# Patient Record
Sex: FEMALE | Race: WHITE | NOT HISPANIC OR LATINO | Employment: FULL TIME | URBAN - METROPOLITAN AREA
[De-identification: names, ages, dates, MRNs, and addresses within clinical notes are randomized per-mention and may not be internally consistent; named-entity substitution may affect disease eponyms.]

---

## 2017-09-19 ENCOUNTER — OFFICE VISIT (OUTPATIENT)
Dept: INTERNAL MEDICINE | Facility: CLINIC | Age: 52
End: 2017-09-19

## 2017-09-19 VITALS — WEIGHT: 143.3 LBS | BODY MASS INDEX: 22.44 KG/M2 | DIASTOLIC BLOOD PRESSURE: 66 MMHG | SYSTOLIC BLOOD PRESSURE: 110 MMHG

## 2017-09-19 DIAGNOSIS — M54.50 CHRONIC LEFT-SIDED LOW BACK PAIN WITHOUT SCIATICA: ICD-10-CM

## 2017-09-19 DIAGNOSIS — M25.552 LEFT HIP PAIN: Primary | ICD-10-CM

## 2017-09-19 DIAGNOSIS — G89.29 CHRONIC LEFT-SIDED LOW BACK PAIN WITHOUT SCIATICA: ICD-10-CM

## 2017-09-19 DIAGNOSIS — N95.1 PERIMENOPAUSE: ICD-10-CM

## 2017-09-19 DIAGNOSIS — Z00.00 ROUTINE HEALTH MAINTENANCE: ICD-10-CM

## 2017-09-19 DIAGNOSIS — M75.112 INCOMPLETE TEAR OF LEFT ROTATOR CUFF: ICD-10-CM

## 2017-09-19 DIAGNOSIS — E55.9 VITAMIN D DEFICIENCY: ICD-10-CM

## 2017-09-19 PROBLEM — S02.2XXA NOSE FRACTURE: Status: RESOLVED | Noted: 2017-09-19 | Resolved: 2017-09-19

## 2017-09-19 PROBLEM — E04.1 CYST OF THYROID: Status: ACTIVE | Noted: 2017-09-19

## 2017-09-19 PROBLEM — M75.82 TENDINITIS OF LEFT ROTATOR CUFF: Status: ACTIVE | Noted: 2017-09-19

## 2017-09-19 PROBLEM — S02.2XXA NOSE FRACTURE: Status: ACTIVE | Noted: 2017-09-19

## 2017-09-19 PROBLEM — M75.82 TENDINITIS OF LEFT ROTATOR CUFF: Status: RESOLVED | Noted: 2017-09-19 | Resolved: 2017-09-19

## 2017-09-19 PROCEDURE — 90686 IIV4 VACC NO PRSV 0.5 ML IM: CPT | Performed by: INTERNAL MEDICINE

## 2017-09-19 PROCEDURE — 99214 OFFICE O/P EST MOD 30 MIN: CPT | Performed by: INTERNAL MEDICINE

## 2017-09-19 PROCEDURE — 90471 IMMUNIZATION ADMIN: CPT | Performed by: INTERNAL MEDICINE

## 2017-09-19 NOTE — PROGRESS NOTES
"Chief Complaint   Patient presents with   • Back Pain   • Fatigue       History of Present Illness  52 y.o.  woman presents, after nearly 2 yr absence, for further evaluation of right low back pain and left groin pain.  Also with complaints of fatigue.  Thinks she sleeps ok through the night.  LMP 3/2017 and has been starting to get mild flushing and hot flashes, tolerable.    Reports right low back pain that started about 2 mos ago, described as an \"ache\" on the inside, not reproducible with palpation.  No specific injuries and no specific triggers.  It comes and goes, does not radiate, is worse with standing (not with walking) and occurs daily.  Denies falls.  Works sitting at computer more than ever and thinks posture/ergonomics is playing role in her sxs as well. Also notes sxs flare-up with increased stress.    Reports left groin pain that started about 1 month ago.  Thinks she may have pulled something with exercise.  Has caused her to limp at times, stephanie when pulling the leg/hip up.  Denies assoc'd numbness/tingling and no radiation down the leg/foot.  Reports 2-3 advil can provide some relief.  Notes it has gotten better since initial onset.    Review of Systems  ROS (+) for right low back pain, left groin pain, fatigue.  Denies insomnia.  Denies headaches, visual changes, CP, palpitations, SOB, cough, abd pain, n/v/d, urinary sxs, numbness/tingling, falls, rashes.     Still with left shoulder pain, better than initial onset but still persistent.  All other ROS reviewed and negative.    [facility]  The following portions of the patient's history were reviewed and updated as appropriate: allergies, current medications, past family history, past medical history, past social history, past surgical history and problem list.    MEDS: none    VITALS:  /66  Wt 143 lb 4.8 oz (65 kg)  BMI 22.44 kg/m2    Physical Exam   Constitutional: She is oriented to person, place, and time. She appears well-developed " "and well-nourished.   Eyes: Conjunctivae and EOM are normal.   Cardiovascular: Normal rate, regular rhythm and normal heart sounds.    Pulmonary/Chest: Effort normal and breath sounds normal. No respiratory distress. She has no wheezes. She has no rales.   Abdominal: Soft. Bowel sounds are normal.   Musculoskeletal: She exhibits tenderness (area of tenderness at the right paraspinal lumbar muscles with assoc'd mod spasm; left upper inguinal area/left suprapubic area mildly tender to palpation, minimally reproduced with ext rotation of left hip; no masses palpated; no inguinal LAD).   bilat hips FROM, no tend with palpation of the greater trochanters or the SI joints bilaterally; normal gait   Neurological: She is alert and oriented to person, place, and time. She displays normal reflexes. No cranial nerve deficit or sensory deficit. She exhibits normal muscle tone. Coordination normal.   Reflex Scores:       Bicep reflexes are 2+ on the right side and 2+ on the left side.       Patellar reflexes are 2+ on the right side and 2+ on the left side.  Psychiatric: She has a normal mood and affect. Her behavior is normal.   Nursing note and vitals reviewed.    ASSESSMENT/PLAN  Problem List Items Addressed This Visit     Perimenopause     Symptomatic with mild hot flashes; discussed menopause dx'd after 1 year without menstrual cycle (LMP 3/17)           Other Visit Diagnoses     Left hip pain    -  Primary    \"groin\"/suprapubic area; attributed to tendon/ligament strain; x 1month; referral to PT for tx as well as guidance re: HEP; prn NSAIDs    Chronic left-sided low back pain without sciatica        due to musculosk strain/spasm; rec warm compresses, prn advil; declines musc relaxant; referral to [facility] PT; also discussed improving ergonomics    Incomplete tear of left rotator cuff        chronic; will also have PT ([facility]) address this and provide HEP          FOLLOW-UP  1. Health maintenance - flu " vaccine given today  2. RTC for updated PE: fasting labs the week prior to appt (CBC, CMP, TSH, lipids, UA/micro, vit D) - escribed      Electronically signed by:    Fabiana Matthews MD  09/19/2017

## 2017-09-20 PROBLEM — N95.1 PERIMENOPAUSE: Status: ACTIVE | Noted: 2017-09-20

## 2017-09-21 NOTE — ASSESSMENT & PLAN NOTE
Symptomatic with mild hot flashes; discussed menopause dx'd after 1 year without menstrual cycle (LMP 3/17)

## 2018-01-05 ENCOUNTER — TELEPHONE (OUTPATIENT)
Dept: INTERNAL MEDICINE | Facility: CLINIC | Age: 53
End: 2018-01-05

## 2018-01-05 ENCOUNTER — OFFICE VISIT (OUTPATIENT)
Dept: INTERNAL MEDICINE | Facility: CLINIC | Age: 53
End: 2018-01-05

## 2018-01-05 ENCOUNTER — LAB (OUTPATIENT)
Dept: INTERNAL MEDICINE | Facility: CLINIC | Age: 53
End: 2018-01-05

## 2018-01-05 ENCOUNTER — HOSPITAL ENCOUNTER (OUTPATIENT)
Dept: GENERAL RADIOLOGY | Facility: HOSPITAL | Age: 53
Discharge: HOME | End: 2018-01-05
Attending: INTERNAL MEDICINE | Admitting: INTERNAL MEDICINE

## 2018-01-05 VITALS
RESPIRATION RATE: 16 BRPM | WEIGHT: 146 LBS | DIASTOLIC BLOOD PRESSURE: 64 MMHG | SYSTOLIC BLOOD PRESSURE: 118 MMHG | HEART RATE: 85 BPM | HEIGHT: 67 IN | OXYGEN SATURATION: 99 % | BODY MASS INDEX: 22.91 KG/M2

## 2018-01-05 DIAGNOSIS — J40 BRONCHITIS: ICD-10-CM

## 2018-01-05 DIAGNOSIS — E55.9 VITAMIN D DEFICIENCY: ICD-10-CM

## 2018-01-05 DIAGNOSIS — J40 BRONCHITIS: Primary | ICD-10-CM

## 2018-01-05 DIAGNOSIS — Z00.00 ROUTINE HEALTH MAINTENANCE: ICD-10-CM

## 2018-01-05 LAB
25(OH)D3 SERPL-MCNC: 23.4 NG/ML
ALBUMIN SERPL-MCNC: 4.3 G/DL (ref 3.2–4.8)
ALBUMIN/GLOB SERPL: 1.7 G/DL (ref 1.5–2.5)
ALP SERPL-CCNC: 60 U/L (ref 25–100)
ALT SERPL W P-5'-P-CCNC: 39 U/L (ref 7–40)
ANION GAP SERPL CALCULATED.3IONS-SCNC: 11 MMOL/L (ref 3–11)
ARTICHOKE IGE QN: 126 MG/DL (ref 0–130)
AST SERPL-CCNC: 25 U/L (ref 0–33)
BACTERIA UR QL AUTO: ABNORMAL /HPF
BASOPHILS # BLD AUTO: 0.04 10*3/MM3 (ref 0–0.2)
BASOPHILS NFR BLD AUTO: 0.6 % (ref 0–1)
BILIRUB SERPL-MCNC: 1.5 MG/DL (ref 0.3–1.2)
BILIRUB UR QL STRIP: NEGATIVE
BUN BLD-MCNC: 12 MG/DL (ref 9–23)
BUN/CREAT SERPL: 17.1 (ref 7–25)
CALCIUM SPEC-SCNC: 8.8 MG/DL (ref 8.7–10.4)
CHLORIDE SERPL-SCNC: 103 MMOL/L (ref 99–109)
CHOLEST SERPL-MCNC: 186 MG/DL (ref 0–200)
CLARITY UR: ABNORMAL
CO2 SERPL-SCNC: 28 MMOL/L (ref 20–31)
COLOR UR: YELLOW
CREAT BLD-MCNC: 0.7 MG/DL (ref 0.6–1.3)
DEPRECATED RDW RBC AUTO: 45.9 FL (ref 37–54)
EOSINOPHIL # BLD AUTO: 0.38 10*3/MM3 (ref 0–0.3)
EOSINOPHIL NFR BLD AUTO: 5.6 % (ref 0–3)
ERYTHROCYTE [DISTWIDTH] IN BLOOD BY AUTOMATED COUNT: 13 % (ref 11.3–14.5)
GFR SERPL CREATININE-BSD FRML MDRD: 88 ML/MIN/1.73
GLOBULIN UR ELPH-MCNC: 2.5 GM/DL
GLUCOSE BLD-MCNC: 93 MG/DL (ref 70–100)
GLUCOSE UR STRIP-MCNC: NEGATIVE MG/DL
HCT VFR BLD AUTO: 41.7 % (ref 34.5–44)
HDLC SERPL-MCNC: 45 MG/DL (ref 40–60)
HGB BLD-MCNC: 13.9 G/DL (ref 11.5–15.5)
HGB UR QL STRIP.AUTO: ABNORMAL
HYALINE CASTS UR QL AUTO: ABNORMAL /LPF
IMM GRANULOCYTES # BLD: 0.02 10*3/MM3 (ref 0–0.03)
IMM GRANULOCYTES NFR BLD: 0.3 % (ref 0–0.6)
KETONES UR QL STRIP: NEGATIVE
LEUKOCYTE ESTERASE UR QL STRIP.AUTO: ABNORMAL
LYMPHOCYTES # BLD AUTO: 1.56 10*3/MM3 (ref 0.6–4.8)
LYMPHOCYTES NFR BLD AUTO: 22.9 % (ref 24–44)
MCH RBC QN AUTO: 32.2 PG (ref 27–31)
MCHC RBC AUTO-ENTMCNC: 33.3 G/DL (ref 32–36)
MCV RBC AUTO: 96.5 FL (ref 80–99)
MONOCYTES # BLD AUTO: 1 10*3/MM3 (ref 0–1)
MONOCYTES NFR BLD AUTO: 14.7 % (ref 0–12)
NEUTROPHILS # BLD AUTO: 3.8 10*3/MM3 (ref 1.5–8.3)
NEUTROPHILS NFR BLD AUTO: 55.9 % (ref 41–71)
NITRITE UR QL STRIP: NEGATIVE
NRBC BLD MANUAL-RTO: 0 /100 WBC (ref 0–0)
PH UR STRIP.AUTO: 6.5 [PH] (ref 5–8)
PLATELET # BLD AUTO: 199 10*3/MM3 (ref 150–450)
PMV BLD AUTO: 10.8 FL (ref 6–12)
POTASSIUM BLD-SCNC: 4.1 MMOL/L (ref 3.5–5.5)
PROT SERPL-MCNC: 6.8 G/DL (ref 5.7–8.2)
PROT UR QL STRIP: NEGATIVE
RBC # BLD AUTO: 4.32 10*6/MM3 (ref 3.89–5.14)
RBC # UR: ABNORMAL /HPF
REF LAB TEST METHOD: ABNORMAL
SODIUM BLD-SCNC: 142 MMOL/L (ref 132–146)
SP GR UR STRIP: 1.02 (ref 1–1.03)
SQUAMOUS #/AREA URNS HPF: ABNORMAL /HPF
TRIGL SERPL-MCNC: 110 MG/DL (ref 0–150)
TSH SERPL DL<=0.05 MIU/L-ACNC: 1.38 MIU/ML (ref 0.35–5.35)
UROBILINOGEN UR QL STRIP: ABNORMAL
WBC NRBC COR # BLD: 6.8 10*3/MM3 (ref 3.5–10.8)
WBC UR QL AUTO: ABNORMAL /HPF
YEAST URNS QL MICRO: ABNORMAL /HPF

## 2018-01-05 PROCEDURE — 85025 COMPLETE CBC W/AUTO DIFF WBC: CPT | Performed by: INTERNAL MEDICINE

## 2018-01-05 PROCEDURE — 84443 ASSAY THYROID STIM HORMONE: CPT | Performed by: INTERNAL MEDICINE

## 2018-01-05 PROCEDURE — 80061 LIPID PANEL: CPT | Performed by: INTERNAL MEDICINE

## 2018-01-05 PROCEDURE — 86803 HEPATITIS C AB TEST: CPT | Performed by: INTERNAL MEDICINE

## 2018-01-05 PROCEDURE — 99213 OFFICE O/P EST LOW 20 MIN: CPT | Performed by: INTERNAL MEDICINE

## 2018-01-05 PROCEDURE — 71046 X-RAY EXAM CHEST 2 VIEWS: CPT

## 2018-01-05 PROCEDURE — 80053 COMPREHEN METABOLIC PANEL: CPT | Performed by: INTERNAL MEDICINE

## 2018-01-05 PROCEDURE — 81001 URINALYSIS AUTO W/SCOPE: CPT | Performed by: INTERNAL MEDICINE

## 2018-01-05 PROCEDURE — 82306 VITAMIN D 25 HYDROXY: CPT | Performed by: INTERNAL MEDICINE

## 2018-01-05 NOTE — TELEPHONE ENCOUNTER
Spoke with pt and advised of provider comment. She verbalized understanding, thanked our office for the time and ended the call.

## 2018-01-05 NOTE — TELEPHONE ENCOUNTER
Pt called wanting to know if she needed to go to the pharmacy and  an antibiotic. I read the impression of the xray report to her and told he nothing acute was going on as far as having pneumonia or anything like that. I told her more than likely she wouldn't need to go  anything but I would double check with Dr. Matthews. She verbalized understanding, thanked me and we ended the call. Please advise if anything additional is needed?

## 2018-01-05 NOTE — TELEPHONE ENCOUNTER
Thanks for informing patient about normal CXR.    Should proceed with over-the-counter meds as discussed but no indication to proceed with abx at this time

## 2018-01-05 NOTE — PROGRESS NOTES
"Chief Complaint   Patient presents with   • Cough   • URI   • Wheezing       History of Present Illness  52 y.o.  woman presents for further eval of cold sxs  HPI started 3 d ago with chest congestion and cough, productive of white sputum. Has been having chest tightness and wheezing; no h/o asthma.  Since then, has developed sore throat, nasal congestion, runny nose, productive of white nasal drainage.  Denies fevers.  Has taken some mucinex.     Review of Systems  ROS (+) for productive cough with wheezing, nasal congestion, runny nose.  Denies sore throat, ear sxs, CP, palpitations. All other ROS reviewed and negative.    [facility]  The following portions of the patient's history were reviewed and updated as appropriate: allergies, current medications, past family history, past medical history, past social history, past surgical history and problem list.    MEDS: none    VITALS:  /64  Pulse 85  Resp 16  Ht 170.2 cm (67\")  Wt 66.2 kg (146 lb)  SpO2 99%  BMI 22.87 kg/m2    Physical Exam   Constitutional: She is oriented to person, place, and time. She appears well-developed and well-nourished.   HENT:   Right Ear: External ear normal.   Left Ear: External ear normal.   Mouth/Throat: Oropharynx is clear and moist. No oropharyngeal exudate (no erythema, swelling, exudate).   Nasal mucosa severely swollen, no erythema, bilaterally   Eyes: Conjunctivae and EOM are normal.   Cardiovascular: Normal rate, regular rhythm and normal heart sounds.    Pulmonary/Chest: Effort normal. No respiratory distress. She has wheezes (only with coughing). She has rales (LLL > RLL). She exhibits no tenderness.   Speaks in complete sentences   Abdominal: Soft. Bowel sounds are normal.   Lymphadenopathy:     She has no cervical adenopathy.   Neurological: She is alert and oriented to person, place, and time.   Skin: Skin is warm and dry. No rash noted.   Psychiatric: She has a normal mood and affect. Her behavior is " normal.   Nursing note and vitals reviewed.      ASSESSMENT/PLAN  Problem List Items Addressed This Visit     None      Visit Diagnoses     Bronchitis    -  Primary    CXR for abnl lung exam; rec antihist, nasal saline spray, steroid nasal spray, prn NSAIDs, mucinex DM; sample dulera 2p BID prn (gargle/spit)    Relevant Orders    XR Chest PA & Lateral          FOLLOW-UP  1. Health maintenance - flu vacc 9/17  2. RTC 1/9/18 for PE as scheduled; PE labs done this morning    Electronically signed by:    Fabiana Matthews MD  01/05/2018

## 2018-01-09 ENCOUNTER — OFFICE VISIT (OUTPATIENT)
Dept: INTERNAL MEDICINE | Facility: CLINIC | Age: 53
End: 2018-01-09

## 2018-01-09 VITALS
HEART RATE: 70 BPM | SYSTOLIC BLOOD PRESSURE: 118 MMHG | WEIGHT: 147 LBS | DIASTOLIC BLOOD PRESSURE: 68 MMHG | RESPIRATION RATE: 16 BRPM | HEIGHT: 67 IN | BODY MASS INDEX: 23.07 KG/M2 | OXYGEN SATURATION: 99 %

## 2018-01-09 DIAGNOSIS — R21 RASH: ICD-10-CM

## 2018-01-09 DIAGNOSIS — E04.1 CYST OF THYROID: ICD-10-CM

## 2018-01-09 DIAGNOSIS — J06.9 VIRAL UPPER RESPIRATORY TRACT INFECTION: ICD-10-CM

## 2018-01-09 DIAGNOSIS — Z12.11 ENCOUNTER FOR SCREENING COLONOSCOPY: ICD-10-CM

## 2018-01-09 DIAGNOSIS — N95.1 PERIMENOPAUSE: ICD-10-CM

## 2018-01-09 DIAGNOSIS — Z00.00 PE (PHYSICAL EXAM), ROUTINE: Primary | ICD-10-CM

## 2018-01-09 DIAGNOSIS — E55.9 VITAMIN D DEFICIENCY: ICD-10-CM

## 2018-01-09 LAB — HCV AB SER DONR QL: NORMAL

## 2018-01-09 PROCEDURE — 99396 PREV VISIT EST AGE 40-64: CPT | Performed by: INTERNAL MEDICINE

## 2018-01-09 NOTE — PROGRESS NOTES
"Chief Complaint   Patient presents with   • Annual Exam   • Rash       History of Present Illness  52 y.o.  woman presents for updated phys examination.  Feeling better from recent cold; did not take any abx.  Still has some residual cough and nasal congestion but overall improved.  Reports rash on chest, upper back, a little on arms, abdomen, and upper thighs, that started Saturday, 3 days ago.  Notes rash is itchy, sparing the hands, feet, lower legs, lower back, and face.  No known exposures; no new meds, topical agents (soaps, lotions, shampoos, body washes, detergents).  Denies previous similar episodes.    Review of Systems  Denies headaches, visual changes (wears contacts and uses bifocals, noting no success with progressive lenses), CP, palpitations, SOB, sore throat, abd pain, n/v/d, difficulty with urination, numbness/tingling, falls, mood changes, lightheadedness, hearing changes.    LMP 3/17; no abnl spotting/discharge.    ROS (+) for pruritic rash x 3 days as above.     All other ROS reviewed and negative.    [facility]  The following portions of the patient's history were reviewed and updated as appropriate: allergies, current medications, past family history, past medical history, past social history, past surgical history and problem list.    MEDS: none    VITALS:  /68  Pulse 70  Resp 16  Ht 170.8 cm (67.25\")  Wt 66.7 kg (147 lb)  SpO2 99%  BMI 22.85 kg/m2    Physical Exam   Constitutional: She is oriented to person, place, and time. She appears well-developed and well-nourished.   HENT:   Head: Normocephalic.   Right Ear: External ear normal.   Left Ear: External ear normal.   Nose: Nose normal.   Mouth/Throat: Oropharynx is clear and moist and mucous membranes are normal. No oropharyngeal exudate.   Mod nasal mucosal swelling, improved   Eyes: Conjunctivae and EOM are normal. Pupils are equal, round, and reactive to light.   Wears contacts   Neck: Normal range of motion. Neck " supple. Carotid bruit is not present (bilaterally). No thyromegaly (no palpable masses, nontender) present.   Cardiovascular: Normal rate, regular rhythm and normal heart sounds.    Pulmonary/Chest: Effort normal and breath sounds normal. No respiratory distress. She has no wheezes. She has no rales.   Abdominal: Soft. Bowel sounds are normal. She exhibits no distension and no mass. There is no hepatosplenomegaly. There is no tenderness.   Genitourinary:   Genitourinary Comments: Breast/pelvic exams deferred to GYN     Musculoskeletal: Normal range of motion. She exhibits no edema.   Lymphadenopathy:     She has no cervical adenopathy.   Neurological: She is alert and oriented to person, place, and time. She has normal reflexes. She displays normal reflexes. No cranial nerve deficit.   Skin: Skin is warm and dry. Rash (collection of erythematous papules with excoriations on the chest between the breasts and upper back, scattered fewer similar lesions at the upper arms/legs, sides of abdomen, pruritic) noted.   Psychiatric: She has a normal mood and affect. Her behavior is normal.   Nursing note and vitals reviewed.      LABS  Results for orders placed or performed in visit on 01/05/18   Comprehensive Metabolic Panel   Result Value Ref Range    Glucose 93 70 - 100 mg/dL    BUN 12 9 - 23 mg/dL    Creatinine 0.70 0.60 - 1.30 mg/dL    Sodium 142 132 - 146 mmol/L    Potassium 4.1 3.5 - 5.5 mmol/L    Chloride 103 99 - 109 mmol/L    CO2 28.0 20.0 - 31.0 mmol/L    Calcium 8.8 8.7 - 10.4 mg/dL    Total Protein 6.8 5.7 - 8.2 g/dL    Albumin 4.30 3.20 - 4.80 g/dL    ALT (SGPT) 39 7 - 40 U/L    AST (SGOT) 25 0 - 33 U/L    Alkaline Phosphatase 60 25 - 100 U/L    Total Bilirubin 1.5 (H) 0.3 - 1.2 mg/dL    eGFR Non African Amer 88 >60 mL/min/1.73    Globulin 2.5 gm/dL    A/G Ratio 1.7 1.5 - 2.5 g/dL    BUN/Creatinine Ratio 17.1 7.0 - 25.0    Anion Gap 11.0 3.0 - 11.0 mmol/L   Lipid Panel   Result Value Ref Range    Total  Cholesterol 186 0 - 200 mg/dL    Triglycerides 110 0 - 150 mg/dL    HDL Cholesterol 45 40 - 60 mg/dL    LDL Cholesterol  126 0 - 130 mg/dL   TSH   Result Value Ref Range    TSH 1.381 0.350 - 5.350 mIU/mL   Vitamin D 25 Hydroxy   Result Value Ref Range    25 Hydroxy, Vitamin D 23.4 ng/ml   CBC Auto Differential   Result Value Ref Range    WBC 6.80 3.50 - 10.80 10*3/mm3    RBC 4.32 3.89 - 5.14 10*6/mm3    Hemoglobin 13.9 11.5 - 15.5 g/dL    Hematocrit 41.7 34.5 - 44.0 %    MCV 96.5 80.0 - 99.0 fL    MCH 32.2 (H) 27.0 - 31.0 pg    MCHC 33.3 32.0 - 36.0 g/dL    RDW 13.0 11.3 - 14.5 %    RDW-SD 45.9 37.0 - 54.0 fl    MPV 10.8 6.0 - 12.0 fL    Platelets 199 150 - 450 10*3/mm3    Neutrophil % 55.9 41.0 - 71.0 %    Lymphocyte % 22.9 (L) 24.0 - 44.0 %    Monocyte % 14.7 (H) 0.0 - 12.0 %    Eosinophil % 5.6 (H) 0.0 - 3.0 %    Basophil % 0.6 0.0 - 1.0 %    Immature Grans % 0.3 0.0 - 0.6 %    Neutrophils, Absolute 3.80 1.50 - 8.30 10*3/mm3    Lymphocytes, Absolute 1.56 0.60 - 4.80 10*3/mm3    Monocytes, Absolute 1.00 0.00 - 1.00 10*3/mm3    Eosinophils, Absolute 0.38 (H) 0.00 - 0.30 10*3/mm3    Basophils, Absolute 0.04 0.00 - 0.20 10*3/mm3    Immature Grans, Absolute 0.02 0.00 - 0.03 10*3/mm3    nRBC 0.0 0.0 - 0.0 /100 WBC   Urinalysis - Urine, Clean Catch   Result Value Ref Range    Color, UA Yellow Yellow, Straw    Appearance, UA Slightly Cloudy (A) Clear    pH, UA 6.5 5.0 - 8.0    Specific Gravity, UA 1.020 1.005 - 1.030    Glucose, UA Negative Negative    Ketones, UA Negative Negative    Bilirubin, UA Negative Negative    Blood, UA Moderate (2+) (A) Negative    Protein, UA Negative Negative    Leuk Esterase, UA Large (3+) (A) Negative    Nitrite, UA Negative Negative    Urobilinogen, UA 0.2 E.U./dL 0.2 - 1.0 E.U./dL   Urinalysis, Microscopic Only - Urine, Clean Catch   Result Value Ref Range    RBC, UA 0-2 None Seen, 0-2 /HPF    WBC, UA 6-12 (A) None Seen /HPF    Bacteria, UA None Seen None Seen, Trace /HPF    Squamous  Epithelial Cells, UA 7-12 (A) None Seen, 0-2 /HPF    Yeast, UA Large/3+ Budding Yeast None Seen /HPF    Hyaline Casts, UA None Seen 0 - 6 /LPF    Methodology Manual Light Microscopy    Hepatitis C antibody   Result Value Ref Range    Hepatitis C Ab Non-Reactive Non-Reactive     1/5/18 nl CXR    ASSESSMENT/PLAN  Problem List Items Addressed This Visit     Cyst of thyroid     Stable TFTs; rec f/u thyr u/s on R. Cyst (last u/s 12/15) but patient declines for now; asx         Vitamin D deficiency     Low at 23.4; rec OTC vit D 2000 units QD supplementation         Perimenopause     LMP 3/17, noting official menopause if no further menstrual cycles as of 3/18; plan to order baseline DEXA thereafter         PE (physical exam), routine - Primary     Health maintenance - flu vacc 9/17; Tdap 3/11; rec looking into Zostavax coverage; mammo 1/17 at  per pt; GYN exam with nl Pap 12/16 at ; DEXA with menopause (probably next yr); colonosc re-referral made; eye exam at [facility] pending - wears contacts and uses readers; dental exam once per yr - rec considering every 6 mos cleanings; (+)seat belt use           Other Visit Diagnoses     Encounter for screening colonoscopy        Relevant Orders    Ambulatory Referral to Gastroenterology    Rash        x3d; rec antihistamine, avoiding hot showers/heat; use nonfragrant/hypoallergenic soaps, shampoos, lotions; HC cream thin layer to itchy spots prn; f/u prn    Viral upper respiratory tract infection        reviewed nl CXR; rec resume antihistamine and steroid nasal spray          FOLLOW-UP  RTC 1yr for annual PE    Electronically signed by:    Fabiana Matthews MD  01/09/2018

## 2018-01-09 NOTE — ASSESSMENT & PLAN NOTE
Health maintenance - flu vacc 9/17; Tdap 3/11; rec looking into Zostavax coverage; mammo 1/17 at  per pt; GYN exam with nl Pap 12/16 at ; DEXA with menopause (probably next yr); colonosc re-referral made; [facility] wears contacts and uses readers; dental exam once per yr - rec considering every 6 mos cleanings; (+)seat belt use

## 2018-01-11 NOTE — ASSESSMENT & PLAN NOTE
LMP 3/17, noting official menopause if no further menstrual cycles as of 3/18; plan to order baseline DEXA thereafter

## 2018-02-05 ENCOUNTER — OUTSIDE FACILITY SERVICE (OUTPATIENT)
Dept: GASTROENTEROLOGY | Facility: CLINIC | Age: 53
End: 2018-02-05

## 2018-02-05 PROCEDURE — G0105 COLORECTAL SCRN; HI RISK IND: HCPCS | Performed by: INTERNAL MEDICINE

## 2018-02-14 PROBLEM — K57.30 DIVERTICULOSIS OF COLON: Status: ACTIVE | Noted: 2018-02-14

## 2020-05-13 ENCOUNTER — TELEPHONE (OUTPATIENT)
Dept: INTERNAL MEDICINE | Facility: CLINIC | Age: 55
End: 2020-05-13

## 2020-05-13 DIAGNOSIS — Z00.00 PE (PHYSICAL EXAM), ROUTINE: ICD-10-CM

## 2020-05-13 DIAGNOSIS — N95.1 PERIMENOPAUSE: ICD-10-CM

## 2020-05-13 DIAGNOSIS — E55.9 VITAMIN D DEFICIENCY: Primary | ICD-10-CM

## 2020-05-13 NOTE — TELEPHONE ENCOUNTER
Patient has a physical on 5/18 and wanted to do labs before but she doesn't have any active orders/    You can reach the patient at [phone]

## 2020-05-14 ENCOUNTER — LAB (OUTPATIENT)
Dept: LAB | Facility: HOSPITAL | Age: 55
End: 2020-05-14

## 2020-05-14 DIAGNOSIS — E55.9 VITAMIN D DEFICIENCY: ICD-10-CM

## 2020-05-14 DIAGNOSIS — N95.1 PERIMENOPAUSE: ICD-10-CM

## 2020-05-14 DIAGNOSIS — Z00.00 PE (PHYSICAL EXAM), ROUTINE: ICD-10-CM

## 2020-05-14 PROBLEM — E78.2 MIXED HYPERLIPIDEMIA: Status: ACTIVE | Noted: 2020-05-14

## 2020-05-14 LAB
25(OH)D3 SERPL-MCNC: 21.6 NG/ML (ref 30–100)
ALBUMIN SERPL-MCNC: 4.4 G/DL (ref 3.5–5.2)
ALBUMIN/GLOB SERPL: 1.6 G/DL
ALP SERPL-CCNC: 53 U/L (ref 39–117)
ALT SERPL W P-5'-P-CCNC: 18 U/L (ref 1–33)
ANION GAP SERPL CALCULATED.3IONS-SCNC: 12.1 MMOL/L (ref 5–15)
AST SERPL-CCNC: 16 U/L (ref 1–32)
BACTERIA UR QL AUTO: NORMAL /HPF
BASOPHILS # BLD AUTO: 0.08 10*3/MM3 (ref 0–0.2)
BASOPHILS NFR BLD AUTO: 1.3 % (ref 0–1.5)
BILIRUB SERPL-MCNC: 1.1 MG/DL (ref 0.2–1.2)
BILIRUB UR QL STRIP: NEGATIVE
BUN BLD-MCNC: 12 MG/DL (ref 6–20)
BUN/CREAT SERPL: 14.8 (ref 7–25)
CALCIUM SPEC-SCNC: 9.9 MG/DL (ref 8.6–10.5)
CHLORIDE SERPL-SCNC: 103 MMOL/L (ref 98–107)
CHOLEST SERPL-MCNC: 204 MG/DL (ref 0–200)
CLARITY UR: CLEAR
CO2 SERPL-SCNC: 26.9 MMOL/L (ref 22–29)
COLOR UR: YELLOW
CREAT BLD-MCNC: 0.81 MG/DL (ref 0.57–1)
DEPRECATED RDW RBC AUTO: 43.8 FL (ref 37–54)
EOSINOPHIL # BLD AUTO: 0.18 10*3/MM3 (ref 0–0.4)
EOSINOPHIL NFR BLD AUTO: 2.9 % (ref 0.3–6.2)
ERYTHROCYTE [DISTWIDTH] IN BLOOD BY AUTOMATED COUNT: 12.5 % (ref 12.3–15.4)
GFR SERPL CREATININE-BSD FRML MDRD: 73 ML/MIN/1.73
GLOBULIN UR ELPH-MCNC: 2.8 GM/DL
GLUCOSE BLD-MCNC: 91 MG/DL (ref 65–99)
GLUCOSE UR STRIP-MCNC: NEGATIVE MG/DL
HCT VFR BLD AUTO: 41.3 % (ref 34–46.6)
HDLC SERPL-MCNC: 42 MG/DL (ref 40–60)
HGB BLD-MCNC: 14.4 G/DL (ref 12–15.9)
HGB UR QL STRIP.AUTO: NEGATIVE
HYALINE CASTS UR QL AUTO: NORMAL /LPF
IMM GRANULOCYTES # BLD AUTO: 0.02 10*3/MM3 (ref 0–0.05)
IMM GRANULOCYTES NFR BLD AUTO: 0.3 % (ref 0–0.5)
KETONES UR QL STRIP: NEGATIVE
LDLC SERPL CALC-MCNC: 132 MG/DL (ref 0–100)
LDLC/HDLC SERPL: 3.13 {RATIO}
LEUKOCYTE ESTERASE UR QL STRIP.AUTO: NEGATIVE
LYMPHOCYTES # BLD AUTO: 2.72 10*3/MM3 (ref 0.7–3.1)
LYMPHOCYTES NFR BLD AUTO: 43.8 % (ref 19.6–45.3)
MCH RBC QN AUTO: 33.2 PG (ref 26.6–33)
MCHC RBC AUTO-ENTMCNC: 34.9 G/DL (ref 31.5–35.7)
MCV RBC AUTO: 95.2 FL (ref 79–97)
MONOCYTES # BLD AUTO: 0.63 10*3/MM3 (ref 0.1–0.9)
MONOCYTES NFR BLD AUTO: 10.1 % (ref 5–12)
NEUTROPHILS # BLD AUTO: 2.58 10*3/MM3 (ref 1.7–7)
NEUTROPHILS NFR BLD AUTO: 41.6 % (ref 42.7–76)
NITRITE UR QL STRIP: NEGATIVE
NRBC BLD AUTO-RTO: 0 /100 WBC (ref 0–0.2)
PH UR STRIP.AUTO: 5.5 [PH] (ref 5–8)
PLATELET # BLD AUTO: 239 10*3/MM3 (ref 140–450)
PMV BLD AUTO: 10.9 FL (ref 6–12)
POTASSIUM BLD-SCNC: 4.6 MMOL/L (ref 3.5–5.2)
PROT SERPL-MCNC: 7.2 G/DL (ref 6–8.5)
PROT UR QL STRIP: NEGATIVE
RBC # BLD AUTO: 4.34 10*6/MM3 (ref 3.77–5.28)
RBC # UR: NORMAL /HPF
REF LAB TEST METHOD: NORMAL
SODIUM BLD-SCNC: 142 MMOL/L (ref 136–145)
SP GR UR STRIP: 1.02 (ref 1–1.03)
SQUAMOUS #/AREA URNS HPF: NORMAL /HPF
TRIGL SERPL-MCNC: 152 MG/DL (ref 0–150)
TSH SERPL DL<=0.05 MIU/L-ACNC: 1.72 UIU/ML (ref 0.27–4.2)
UROBILINOGEN UR QL STRIP: NORMAL
VLDLC SERPL-MCNC: 30.4 MG/DL (ref 5–40)
WBC NRBC COR # BLD: 6.21 10*3/MM3 (ref 3.4–10.8)
WBC UR QL AUTO: NORMAL /HPF

## 2020-05-14 PROCEDURE — 84443 ASSAY THYROID STIM HORMONE: CPT | Performed by: INTERNAL MEDICINE

## 2020-05-14 PROCEDURE — 81001 URINALYSIS AUTO W/SCOPE: CPT | Performed by: INTERNAL MEDICINE

## 2020-05-14 PROCEDURE — 82306 VITAMIN D 25 HYDROXY: CPT | Performed by: INTERNAL MEDICINE

## 2020-05-14 PROCEDURE — 80053 COMPREHEN METABOLIC PANEL: CPT | Performed by: INTERNAL MEDICINE

## 2020-05-14 PROCEDURE — 85025 COMPLETE CBC W/AUTO DIFF WBC: CPT | Performed by: INTERNAL MEDICINE

## 2020-05-14 PROCEDURE — 80061 LIPID PANEL: CPT | Performed by: INTERNAL MEDICINE

## 2020-05-18 ENCOUNTER — OFFICE VISIT (OUTPATIENT)
Dept: INTERNAL MEDICINE | Facility: CLINIC | Age: 55
End: 2020-05-18

## 2020-05-18 VITALS
DIASTOLIC BLOOD PRESSURE: 60 MMHG | SYSTOLIC BLOOD PRESSURE: 108 MMHG | OXYGEN SATURATION: 99 % | WEIGHT: 151.2 LBS | BODY MASS INDEX: 23.51 KG/M2 | HEART RATE: 68 BPM

## 2020-05-18 DIAGNOSIS — Z00.00 PE (PHYSICAL EXAM), ROUTINE: Primary | ICD-10-CM

## 2020-05-18 DIAGNOSIS — H53.8 FLASHING LIGHT: ICD-10-CM

## 2020-05-18 DIAGNOSIS — Z78.0 MENOPAUSE: ICD-10-CM

## 2020-05-18 DIAGNOSIS — E55.9 VITAMIN D DEFICIENCY: ICD-10-CM

## 2020-05-18 DIAGNOSIS — E78.2 MIXED HYPERLIPIDEMIA: ICD-10-CM

## 2020-05-18 DIAGNOSIS — F41.9 ANXIETY: ICD-10-CM

## 2020-05-18 PROCEDURE — 93000 ELECTROCARDIOGRAM COMPLETE: CPT | Performed by: INTERNAL MEDICINE

## 2020-05-18 PROCEDURE — 99214 OFFICE O/P EST MOD 30 MIN: CPT | Performed by: INTERNAL MEDICINE

## 2020-05-18 PROCEDURE — 99396 PREV VISIT EST AGE 40-64: CPT | Performed by: INTERNAL MEDICINE

## 2020-05-18 RX ORDER — ACETAMINOPHEN 160 MG
2000 TABLET,DISINTEGRATING ORAL DAILY
Qty: 30 CAPSULE | Refills: 11
Start: 2020-05-18

## 2020-05-18 NOTE — PROGRESS NOTES
Chief Complaint   Patient presents with   • Annual Exam       History of Present Illness  55 y.o.  woman presents for updated phys examination as well as concerns re: mood. States all kids are out of the house. Daughters live together in [place]. Daughter Artem has some stressors as well as patient thinks this feeds herself not feeling well. Reports easy crying, increased fear, and increased sensation of tension. Has been walking for exercise. Denies sleep concerns. Has continued to work; works with her .    Reports also flashing at the outer right eye. Occurs with blinking; occurring while in the office today. No assoc'd visual changes, vision loss, black spots, or eye pain. Left eye asx. Last eye exam was in 1/2020 but not having flashing at that time.    Review of Systems  Denies headaches, visual changes, CP, palpitations, SOB, cough, abd pain, n/v/d, difficulty with urination, numbness/tingling, falls, lightheadedness, hearing changes, rashes. ROS (+) for depr/anxiety as above with tension, stress, easy crying, feeling badly.     Denies vaginal discharge or bleeding (no periods - LMP about 2 yrs ago) or breast concerns.   All other ROS reviewed and negative.    [facility]  The following portions of the patient's history were reviewed and updated as appropriate: allergies, current medications, past family history, past medical history, past social history, past surgical history and problem list.    MEDS: none, incl no OTC meds.    VITALS:  /60   Pulse 68   Wt 68.6 kg (151 lb 3.2 oz)   SpO2 99%   Breastfeeding No   BMI 23.51 kg/m²     Physical Exam   Constitutional: She is oriented to person, place, and time. She appears well-developed and well-nourished.   HENT:   Head: Normocephalic.   Right Ear: External ear normal.   Left Ear: External ear normal.   Nose: Nose normal.   Mouth/Throat: Oropharynx is clear and moist and mucous membranes are normal. No oropharyngeal exudate.   Eyes: Pupils  are equal, round, and reactive to light. Conjunctivae and EOM are normal. Right eye exhibits no discharge. Left eye exhibits no discharge. No scleral icterus.   Neck: Normal range of motion. Neck supple. Carotid bruit is not present (bilaterally). No thyromegaly present.   Cardiovascular: Normal rate, regular rhythm, normal heart sounds and intact distal pulses.   Pulmonary/Chest: Effort normal and breath sounds normal. No respiratory distress. She has no wheezes. She has no rales.   Abdominal: Soft. Bowel sounds are normal. She exhibits no distension and no mass. There is no hepatosplenomegaly. There is no tenderness.   Genitourinary:   Genitourinary Comments: Breast exam unremarkable without masses, skin changes, nipple discharge, or axillary adenopathy.     Musculoskeletal: She exhibits no edema.   Lymphadenopathy:     She has no cervical adenopathy.   Neurological: She is alert and oriented to person, place, and time. She displays normal reflexes. No cranial nerve deficit.   Skin: Skin is warm and dry. No rash noted.   Psychiatric: She has a normal mood and affect. Her behavior is normal.   Nursing note and vitals reviewed.      LABS  Results for orders placed or performed in visit on 05/14/20   Comprehensive metabolic panel   Result Value Ref Range    Glucose 91 65 - 99 mg/dL    BUN 12 6 - 20 mg/dL    Creatinine 0.81 0.57 - 1.00 mg/dL    Sodium 142 136 - 145 mmol/L    Potassium 4.6 3.5 - 5.2 mmol/L    Chloride 103 98 - 107 mmol/L    CO2 26.9 22.0 - 29.0 mmol/L    Calcium 9.9 8.6 - 10.5 mg/dL    Total Protein 7.2 6.0 - 8.5 g/dL    Albumin 4.40 3.50 - 5.20 g/dL    ALT (SGPT) 18 1 - 33 U/L    AST (SGOT) 16 1 - 32 U/L    Alkaline Phosphatase 53 39 - 117 U/L    Total Bilirubin 1.1 0.2 - 1.2 mg/dL    eGFR Non African Amer 73 >60 mL/min/1.73    Globulin 2.8 gm/dL    A/G Ratio 1.6 g/dL    BUN/Creatinine Ratio 14.8 7.0 - 25.0    Anion Gap 12.1 5.0 - 15.0 mmol/L   TSH   Result Value Ref Range    TSH 1.720 0.270 - 4.200  uIU/mL   Lipid panel   Result Value Ref Range    Total Cholesterol 204 (H) 0 - 200 mg/dL    Triglycerides 152 (H) 0 - 150 mg/dL    HDL Cholesterol 42 40 - 60 mg/dL    LDL Cholesterol  132 (H) 0 - 100 mg/dL    VLDL Cholesterol 30.4 5 - 40 mg/dL    LDL/HDL Ratio 3.13    Vitamin D 25 hydroxy   Result Value Ref Range    25 Hydroxy, Vitamin D 21.6 (L) 30.0 - 100.0 ng/ml   CBC Auto Differential   Result Value Ref Range    WBC 6.21 3.40 - 10.80 10*3/mm3    RBC 4.34 3.77 - 5.28 10*6/mm3    Hemoglobin 14.4 12.0 - 15.9 g/dL    Hematocrit 41.3 34.0 - 46.6 %    MCV 95.2 79.0 - 97.0 fL    MCH 33.2 (H) 26.6 - 33.0 pg    MCHC 34.9 31.5 - 35.7 g/dL    RDW 12.5 12.3 - 15.4 %    RDW-SD 43.8 37.0 - 54.0 fl    MPV 10.9 6.0 - 12.0 fL    Platelets 239 140 - 450 10*3/mm3    Neutrophil % 41.6 (L) 42.7 - 76.0 %    Lymphocyte % 43.8 19.6 - 45.3 %    Monocyte % 10.1 5.0 - 12.0 %    Eosinophil % 2.9 0.3 - 6.2 %    Basophil % 1.3 0.0 - 1.5 %    Immature Grans % 0.3 0.0 - 0.5 %    Neutrophils, Absolute 2.58 1.70 - 7.00 10*3/mm3    Lymphocytes, Absolute 2.72 0.70 - 3.10 10*3/mm3    Monocytes, Absolute 0.63 0.10 - 0.90 10*3/mm3    Eosinophils, Absolute 0.18 0.00 - 0.40 10*3/mm3    Basophils, Absolute 0.08 0.00 - 0.20 10*3/mm3    Immature Grans, Absolute 0.02 0.00 - 0.05 10*3/mm3    nRBC 0.0 0.0 - 0.2 /100 WBC   Urinalysis without microscopic (no culture) - Urine, Clean Catch   Result Value Ref Range    Color, UA Yellow Yellow, Straw    Appearance, UA Clear Clear    pH, UA 5.5 5.0 - 8.0    Specific Gravity, UA 1.019 1.005 - 1.030    Glucose, UA Negative Negative    Ketones, UA Negative Negative    Bilirubin, UA Negative Negative    Blood, UA Negative Negative    Protein, UA Negative Negative    Leuk Esterase, UA Negative Negative    Nitrite, UA Negative Negative    Urobilinogen, UA 0.2 E.U./dL 0.2 - 1.0 E.U./dL   Urinalysis, Microscopic Only - Urine, Clean Catch   Result Value Ref Range    RBC, UA 0-2 None Seen, 0-2 /HPF    WBC, UA 0-2 None Seen,  0-2 /HPF    Bacteria, UA None Seen None Seen /HPF    Squamous Epithelial Cells, UA 0-2 None Seen, 0-2 /HPF    Hyaline Casts, UA 0-2 None Seen /LPF    Methodology Automated Microscopy        ECG 12 Lead  Date/Time: 5/18/2020 4:52 PM  Performed by: Fabiana Matthews MD  Authorized by: Fabiana Matthews MD   Previous ECG: no previous ECG available  Rhythm: sinus rhythm  Rate: normal  BPM: 67  Conduction: incomplete right bundle branch block  ST Segments: ST segments normal  T Waves: T waves normal  QRS axis: left  Other: no other findings    Clinical impression: non-specific ECG            ASSESSMENT/PLAN  Problem List Items Addressed This Visit        Cardiovascular and Mediastinum    Hyperlipidemia     NEW dx with bord  (goal <130), bord  (goal < 150), and bord HDL 42 (goal > 50); decrease saturated fats and cholesterol in the diet; encouraged increased aerobic exercise; repeat lipids in 12 mos           Relevant Orders    ECG 12 Lead       Digestive    Vitamin D deficiency     Low at 21.6 with goal > 30; rec OTC vit D 2000 units QD; f/u level in 3-4 mos         Relevant Medications    Cholecalciferol (VITAMIN D3) 50 MCG (2000 UT) capsule    Other Relevant Orders    Vitamin D 25 Hydroxy       Genitourinary    Menopause    Relevant Orders    DEXA Bone Density Axial       Other    PE (physical exam), routine - Primary     Health maintenance - rec flu vacc in the fall; Tdap 3/11 (Td due 2021); HAV and Shingrix recommended; mammo 2/2020 at  per pt; GYN/Pap at  to be updated (last 3 yrs ago); 1st DEXA ordered; colonoscopy 2/5/18, FIT not done last yr, next colonosc due 2021 per Dr. Cameron; eye exam 1/2020 but recommend eye exam with R. eye flashing x 1 week; dental exam qyr - rec q6 mos; (+) seat belt use         Anxiety     Multiple stressors - COVID19, kids, fears, menopause, other; normal TSH; counseled patient regarding multimodal approach with healthy nutrition, healthy sleep, regular physical activity,  social activities, counseling, and medications.  Discussed role of counseling to help with mindfulness and CBT - rec psychology consultation (PhD vs APRN vs LCSW)             Other Visit Diagnoses     Flashing light        RIGHT eye; no visual deficits; x 1 week; could be stress but rec updated eye exam this week (next wk at latest) for further evaluation        Time Documentation  Counseled patient  I spent 40 minutes face to face with the patient and > 50 % of the time was spent counseling, coordinating care, answering questions, and discussing evaluation and treatment plans.  Counseling topics: diagnosis, lab results, treatment options, follow up plan, return instructions and discussion of mental health issues    FOLLOW-UP  1. F/u vit D in 3-4 mos (nonfasting, escribed)  2. OK to fax list of covered psychology providers for recs if she wants  3. REquest 2/2020 mammogram  4. RTC PE in 1 yr    Electronically signed by:    Fabiana Matthews MD, FACP  05/18/2020

## 2020-05-19 NOTE — ASSESSMENT & PLAN NOTE
Health maintenance - rec flu vacc in the fall; Tdap 3/11 (Td due 2021); HAV and Shingrix recommended; mammo 2/2020 at [facility] per pt; GYN/Pap at  to be updated (last 3 yrs ago); 1st DEXA ordered; colonoscopy 2/5/18, FIT not done last yr, next colonosc due 2021 per Dr. Cameron; eye exam 1/2020 but recommend eye exam with R. eye flashing x 1 week; dental exam qyr - rec q6 mos; (+) seat belt use  
Low at 21.6 with goal > 30; rec OTC vit D 2000 units QD; f/u level in 3-4 mos  
Multiple stressors - COVID19, kids, fears, menopause, other; normal TSH; counseled patient regarding multimodal approach with healthy nutrition, healthy sleep, regular physical activity, social activities, counseling, and medications.  Discussed role of counseling to help with mindfulness and CBT - rec psychology consultation (PhD vs APRN vs LCSW)    
NEW dx with bord  (goal <130), bord  (goal < 150), and bord HDL 42 (goal > 50); decrease saturated fats and cholesterol in the diet; encouraged increased aerobic exercise; repeat lipids in 12 mos    
Patient/Mother

## 2020-07-27 ENCOUNTER — HOSPITAL ENCOUNTER (OUTPATIENT)
Dept: BONE DENSITY | Facility: HOSPITAL | Age: 55
Discharge: HOME | End: 2020-07-27
Admitting: INTERNAL MEDICINE

## 2020-07-27 DIAGNOSIS — Z78.0 MENOPAUSE: ICD-10-CM

## 2020-07-27 PROCEDURE — 77080 DXA BONE DENSITY AXIAL: CPT

## 2020-07-28 PROBLEM — M85.89 OSTEOPENIA OF MULTIPLE SITES: Status: ACTIVE | Noted: 2020-07-28

## 2020-07-28 NOTE — PROGRESS NOTES
Dear Lou,    Thank you for obtaining your DEXA (bone density) scan.  I have received those results and would like to review them with you.      Your bone density is borderline for osteopenia. This is between normal and osteoporosis.      Please maintain regular calcium and vitamin D supplementation.  Calcium intake should be 1000mg per day between diet and supplements.  Weight bearing exercise is good for bone health as well.    We should plan to repeat your DEXA in 2-3 years.  Please let me know if you have any questions or concerns regarding these results.        Sincerely,  Fabiaan Matthews MD, FACP

## 2020-09-21 PROCEDURE — U0003 INFECTIOUS AGENT DETECTION BY NUCLEIC ACID (DNA OR RNA); SEVERE ACUTE RESPIRATORY SYNDROME CORONAVIRUS 2 (SARS-COV-2) (CORONAVIRUS DISEASE [COVID-19]), AMPLIFIED PROBE TECHNIQUE, MAKING USE OF HIGH THROUGHPUT TECHNOLOGIES AS DESCRIBED BY CMS-2020-01-R: HCPCS | Performed by: PHYSICIAN ASSISTANT

## 2020-09-23 ENCOUNTER — TELEPHONE (OUTPATIENT)
Dept: URGENT CARE | Facility: CLINIC | Age: 55
End: 2020-09-23

## 2020-09-23 NOTE — TELEPHONE ENCOUNTER
Talked to pt about COVID-19 test results. Pt understands the CDC guidelines. Pt is feeling better.       ----- Message from NIRMALA Hawkins sent at 9/23/2020  2:53 PM EDT -----  COVID19 not detected.  Follow CDC guidelines.  Recommend follow-up with PCP

## 2021-03-31 ENCOUNTER — OFFICE VISIT (OUTPATIENT)
Dept: INTERNAL MEDICINE | Facility: CLINIC | Age: 56
End: 2021-03-31

## 2021-03-31 VITALS
BODY MASS INDEX: 23.23 KG/M2 | SYSTOLIC BLOOD PRESSURE: 120 MMHG | DIASTOLIC BLOOD PRESSURE: 70 MMHG | OXYGEN SATURATION: 99 % | TEMPERATURE: 97.8 F | HEART RATE: 68 BPM | WEIGHT: 149.4 LBS

## 2021-03-31 DIAGNOSIS — S91.012D LACERATION OF LEFT ANKLE, SUBSEQUENT ENCOUNTER: ICD-10-CM

## 2021-03-31 DIAGNOSIS — S91.111D LACERATION OF RIGHT GREAT TOE WITHOUT FOREIGN BODY PRESENT OR DAMAGE TO NAIL, SUBSEQUENT ENCOUNTER: Primary | ICD-10-CM

## 2021-03-31 DIAGNOSIS — M25.472 ANKLE SWELLING, LEFT: ICD-10-CM

## 2021-03-31 PROCEDURE — 99213 OFFICE O/P EST LOW 20 MIN: CPT | Performed by: NURSE PRACTITIONER

## 2021-03-31 NOTE — PROGRESS NOTES
"  Chief Complaint   Patient presents with   • Lower Extremity Issue       History of Present Illness    56 y.o.female presents for lower extremity issue; recent injury.  Patient describes having an injury to both of her lower legs about 10 days ago.  She dropped a glass on her right foot and sliced her right toe.  A small piece of the glass must have also hit the front of her left ankle as \"nicked\" that area.  She treated at a [facility] in [place] as traveling at that time. describes having topical glue and butterfly closures to her right toe and left ankle area.  The right toe laceration has been healing fine; no complaints.  The laceration on the left ankle is healing; no redness swelling or pain at the laceration site.  She has noticed though some swelling and pain to the left medial malleolus.  She is thinking that she may have been favoring the right foot and has sprained or overused the left ankle.  No redness on the left foot or ankle.  No fever.  Denies having any twisting, inversion or eversion injury, or fall.  Has not tried any treatment at home for the left medial ankle swelling pain.    Review of Systems   Constitutional: Negative for chills and fever.   Musculoskeletal: Positive for arthralgias and joint swelling.   Skin: Negative for color change.        laceration           [facility]  The following portions of the patient's history were reviewed and updated as appropriate: allergies, current medications, past family history, past medical history, past social history, past surgical history and problem list.     Past Medical History:   Diagnosis Date   • Hx of bone density study 07/28/2020    DEXA (7/28/20) L -0.4, H-1.2, repeat 2-3 yrs   • Hx of chest x-ray 01/05/2018    nl CXR (1/5/18): min degen changes   • Hx of colonoscopy 02/05/2018    colonosc (2/5/18): diverticulosis, repeat 3 yrs (but rec FIT in 1 yr); GI - Dr. Cameron   • Nose fracture 10/2014    Description: secondary to acorn trauma (10/14); ENT - " Dr. Solo   • Tendinitis of left rotator cuff 12/18/2015    Impression: 12/18/2015 - sxs ongoing > 1 month; referral given for PT; also recommend prn NSAIDs, stephanie prior to PT session (advil 3 tid prn with food);       No Known Allergies   Social History     Tobacco Use   • Smoking status: Never Smoker   • Smokeless tobacco: Never Used   Substance Use Topics   • Alcohol use: Yes     Comment: social   • Drug use: No         Current Outpatient Medications:   •  Cholecalciferol (VITAMIN D3) 50 MCG (2000 UT) capsule, Take 1 capsule by mouth Daily., Disp: 30 capsule, Rfl: 11    VITALS:  /70   Pulse 68   Temp 97.8 °F (36.6 °C)   Wt 67.8 kg (149 lb 6.4 oz)   SpO2 99%   Breastfeeding No   BMI 23.23 kg/m²     Physical Exam  HENT:      Head: Normocephalic.   Musculoskeletal:      Right ankle: Normal.      Left ankle: Swelling and laceration present. No ecchymosis. Tenderness present over the medial malleolus. Normal range of motion. Normal pulse.      Left Achilles Tendon: Normal.      Right foot: Normal range of motion and normal capillary refill. Laceration present. No tenderness or bony tenderness. Normal pulse.      Left foot: Normal range of motion and normal capillary refill. No tenderness or bony tenderness. Normal pulse.        Legs:       Comments: Right great toe with bandaid intact; no drainage. 1/2 inch laceration just above the DP well approximated no redness drainage swelling or tenderness to palpation; full ROM right toes/foot.  Left ankle: small maybe 1/4 inch laceration just over anterior ankle well approximated no redness drainage swelling or tenderness to palpation. No evidence of foreign object.  Small amt swelling above left medial malleolus tender to palpation without redness or warmth; no decrease in ROM noted.   Skin:     General: Skin is warm and dry.   Neurological:      General: No focal deficit present.      Mental Status: She is alert and oriented to person, place, and time.          Result Review :            Assessment and Plan    Diagnoses and all orders for this visit:    1. Laceration of right great toe without foreign body present or damage to nail, subsequent encounter (Primary)  Healing well; cont bandaid as needed.   2. Laceration of left ankle, subsequent encounter  Anterior lac; Healing well  3. Ankle swelling, left  Medial side. Likely some overuse of left foot to offset use of injured right toe. Left ankle/foot does not look like cellulitis or foreign object at lac site.  Recommend RICE rest ice compression elevation. Written instructions provided for such. Provided applied ace bandage. Gentle range of motion. Recommend tylenol and or nsaid for pain.  Should improve over next few days. If no improvement or worsening she is to let me know.         Follow Up   Return if symptoms worsen or fail to improve.      I discussed the patients findings and my recommendations with patient.  Patient was encouraged to keep me informed of any acute changes, lack of improvement, or any new concerning symptoms.  Patient voiced understanding of all instructions and denied further questions.    Electronically signed by:    Pratima Farrar, APRN  03/31/2021    EMR Dragon/Transcription Disclaimer:  Much of this encounter note is an electronic transcription/translation of spoken language to printed text.  The electronic translation of spoken language may permit erroneous, or at times, nonsensical words or phrases to be inadvertently transcribed.  Although I have reviewed the note for such errors, some may still exist

## 2021-03-31 NOTE — PATIENT INSTRUCTIONS
Elastic Bandage and RICE Therapy    Elastic bandages come in different shapes and sizes. They generally provide support to your injury and reduce swelling while you are healing, but they can perform different functions. Your health care provider will help you to decide what is best for your protection, recovery, or rehabilitation after an injury.  The routine care of many injuries includes rest, ice, compression, and elevation (RICE therapy). RICE therapy is often recommended for injuries to soft tissues, such as muscle strain, sprains, bruises, and overuse injuries. It can also be used for some bone injuries. Using RICE therapy can help to relieve pain and lessen swelling.  What are some general tips for using an elastic bandage?  · Use the bandage as directed by the maker of the bandage that you are using.  · Do not wrap the bandage too tightly. This may block (cut off) the circulation in the arm or leg in the area below the bandage.  ? If part of your body beyond the bandage becomes blue, numb, cold, swollen, or more painful, your bandage is probably too tight. If this occurs, remove your bandage and reapply it more loosely.  · Remove and reapply an elastic bandage every 3-4 hours or as told by your health care provider.  · See your health care provider if the bandage seems to be making your problems worse rather than better.  How to care for your injury with RICE therapy  Rest  Rest your injury. This may help with the healing process. Rest usually involves limiting your normal activities and not using the injured part of your body. Generally, you can return to your normal activities when your health care provider says it is okay and you can do them without much discomfort.  If you rest the injury too much, it may not heal as well. Some injuries heal better with early movement instead of resting for too long. Talk with your health care provider about how you should limit your activities and whether you should  start range-of-motion exercises for your injury.  Ice  Ice your injury to lessen swelling and pain. Do not apply ice directly to your skin.  · Put ice in a plastic bag.  · Place a towel between your skin and the bag.  · Leave the ice on for 20 minutes, 2-3 times a day. Use ice on as many days as told by your health care provider.    Compression  Put pressure (compression) on your injured area to control swelling, give support, and help with discomfort. Compression may be done with an elastic bandage.  Elevation  Raise (elevate) your injured area to lessen swelling and pain. If possible, elevate your injured area at or above the level of your heart or the center of your chest.  Contact a health care provider if:  · Your pain and swelling continue.  · Your symptoms are getting worse rather than improving.  Having these problems may mean that you need further evaluation or imaging tests, such as X-rays or an MRI. Sometimes, X-rays may not show a small broken bone (fracture) until days after the injury happened. Make a follow-up appointment with your health care provider. Ask your health care provider, or the department that is doing the imaging test, when your results will be ready.  Get help right away if:  · You have sudden severe pain at or below the area of your injury.  · You have redness or increased swelling around your injury.  · You have tingling or numbness at or below the area of your injury and it does not improve after you remove the elastic bandage.  Summary  · Elastic bandages provide support to your injury and reduce swelling while you are healing. Your health care provider will help you decide which type of elastic bandage is best for your injury.  · Do not wrap the bandage too tightly. This may block (cut off) the circulation in the arm or leg in the area below the bandage.  · Putting pressure (compression) on your injured area with an elastic bandage is part of RICE therapy. RICE therapy includes  rest, ice, compression, and elevation. This treatment is recommended for the routine care of many injuries.  This information is not intended to replace advice given to you by your health care provider. Make sure you discuss any questions you have with your health care provider.  Document Revised: 09/07/2018 Document Reviewed: 09/07/2018  [employer]vier Patient Education © 2021 [employer]

## 2021-05-19 PROBLEM — Z78.0 MENOPAUSE: Chronic | Status: ACTIVE | Noted: 2017-09-20

## 2021-05-19 PROBLEM — F41.9 ANXIETY: Chronic | Status: ACTIVE | Noted: 2020-05-18

## 2021-05-19 PROBLEM — E04.1 CYST OF THYROID: Chronic | Status: ACTIVE | Noted: 2017-09-19

## 2021-05-19 PROBLEM — E78.2 MIXED HYPERLIPIDEMIA: Chronic | Status: ACTIVE | Noted: 2020-05-14

## 2021-05-19 PROBLEM — K57.30 DIVERTICULOSIS OF COLON: Chronic | Status: ACTIVE | Noted: 2018-02-14

## 2021-05-19 PROBLEM — M85.89 OSTEOPENIA OF MULTIPLE SITES: Chronic | Status: ACTIVE | Noted: 2020-07-28

## 2021-05-19 PROBLEM — E55.9 VITAMIN D DEFICIENCY: Chronic | Status: ACTIVE | Noted: 2017-09-19

## 2021-05-20 NOTE — PROGRESS NOTES
"Chief Complaint   Patient presents with   • Annual Exam   • Hyperlipidemia   • Vitamin D Deficiency       History of Present Illness  56 y.o.  woman presents for updated phys examination. Feels well overall. Reports injury back in 3/21 when she dropped a piece of broken glass from a large frame onto her feet.  And made a very significant cut at the right big toe and then bounced off the top of the left foot.  She went to [facility] where they used \"glue\" to glue the bleeding wound together back on the right big toe.  They cleaned out the left top of the foot.  The right big toe has healed fairly well but she notes persistent swelling at the top of the left ankle with minimal decreased range of motion.  Does not have any limitations with walking or climbing stairs.  Does not have any pain where the cut was but the line from the cut is still present.  Does not think that any pieces of glass are retained.  Is worried about a blood clot.    Review of Systems  Denies headaches, visual changes, CP, palpitations, SOB, cough, abd pain, n/v/d, difficulty with urination, numbness/tingling, falls, mood changes, lightheadedness, hearing changes, rashes.    ROS (+) for left ankle swelling as noted.    Denies vaginal discharge or bleeding (no periods) or breast concerns.   All other ROS reviewed and negative.    Current Outpatient Medications:   •  Cholecalciferol (VITAMIN D3) 50 MCG (2000 UT) QD    VITALS:  /72   Pulse 76   Ht 167.6 cm (66\")   Wt 65.8 kg (145 lb)   SpO2 98%   BMI 23.40 kg/m²     Physical Exam  Vitals and nursing note reviewed.   Constitutional:       General: She is not in acute distress.     Appearance: Normal appearance. She is well-developed.   HENT:      Head: Normocephalic.      Right Ear: Ear canal and external ear normal.      Left Ear: Ear canal and external ear normal.      Nose: Nose normal.   Eyes:      Extraocular Movements: Extraocular movements intact.      Conjunctiva/sclera: " Conjunctivae normal.      Pupils: Pupils are equal, round, and reactive to light.   Neck:      Vascular: No carotid bruit (bilaterally).   Cardiovascular:      Rate and Rhythm: Normal rate and regular rhythm.      Heart sounds: Normal heart sounds.   Pulmonary:      Effort: Pulmonary effort is normal. No respiratory distress.      Breath sounds: Normal breath sounds.   Abdominal:      General: Bowel sounds are normal. There is no distension.      Palpations: Abdomen is soft. There is no mass.      Tenderness: There is no abdominal tenderness.   Genitourinary:     Comments: Breast/pelvic exams deferred to GYN    Musculoskeletal:         General: Swelling (Mild anterior left ankle swelling without erythema, warmth or associated pedal edema) and signs of injury (Medial aspect of the right big toe with well-healed but still pink laceration, no swelling, nontender) present.      Cervical back: Normal range of motion and neck supple.      Right lower leg: No edema (No pedal edema bilaterally and no swelling of the lower legs bilaterally).      Left lower leg: No edema.      Comments: Left ankle with minimal tenderness with plantar flexion as well as eversion   Lymphadenopathy:      Cervical: No cervical adenopathy.   Skin:     General: Skin is warm and dry.      Findings: No rash.   Neurological:      Mental Status: She is alert and oriented to person, place, and time.      Cranial Nerves: No cranial nerve deficit.      Gait: Gait normal.      Deep Tendon Reflexes: Reflexes normal.   Psychiatric:         Mood and Affect: Mood normal.         Behavior: Behavior normal.           LABS  Results for orders placed or performed during the hospital encounter of 09/21/20   COVID-19,[facility] ROUTINE, NP/OP SWAB IN TRANSPORT MEDIA OR ESWAB 72 HR TAT - Swab, Oropharynx    Specimen: Oropharynx; Swab   Result Value Ref Range    SARS-CoV-2, [place] Not Detected Not Detected   COVID [facility] Priority - Swab, Oropharynx    Specimen:  Oropharynx; Swab   Result Value Ref Range    COVID [facility] PRIORITY Comment      5/20 vit D 21.6,       ECG 12 Lead    Date/Time: 5/21/2021 1:00 PM  Performed by: Fabiana Matthews MD  Authorized by: Fabiana Matthews MD   Comparison: compared with previous ECG from 5/18/2020  Similar to previous ECG  Rhythm: sinus rhythm  Rate: normal  BPM: 73  Conduction: conduction normal  Conduction: incomplete right bundle branch block  ST Segments: ST segments normal  T Waves: T waves normal  QRS axis: left  Other findings: non-specific ST-T wave changes  Clinical impression comment: stable EKG              ASSESSMENT/PLAN    Diagnoses and all orders for this visit:    1. PE (physical exam), routine (Primary)  Assessment & Plan:  Health maintenance - flu recommended for the fall; Td given today - good for 10 yrs (Tdap 3/11); HAV #2 given today; Shingrix recommended; COVID19 vacc done; mammo 2/19/20 at ; GYN/Pap at  [facility] (last 3 yrs ago); DEXA 7/20, repeat 2022; colonoscopy 2/5/18, FIT not done last yr, colonosc due 2021 per Dr. Cameron - referral made; eye exam Qyr, UTD; dental exam q6 mos; (+) seat belt use; PE labs ordered    Orders:  -     Td Vaccine Greater Than or Equal To 6yo Preservative Free IM  -     Hepatitis A Vaccine Adult IM  -     CBC & Differential; Future  -     Comprehensive Metabolic Panel; Future  -     Urinalysis With Microscopic - Urine, Clean Catch; Future  -     TSH; Future    2. Hyperlipidemia  Assessment & Plan:  Update lipids with goal LDL < 130; no meds    Orders:  -     ECG 12 Lead  -     Lipid Panel; Future    3. Vitamin D deficiency  Assessment & Plan:  Update vit D level on 3000 units QD supplementation    Orders:  -     Vitamin D 25 Hydroxy; Future    4. Osteopenia  Assessment & Plan:  Calcium and vitamin D supplementation with weight-bearing exercise; DEXA 7/20, repeat 2022         5. Screening for colon cancer  -     Ambulatory Referral to Gastroenterology    6. Left ankle  swelling  Comments:  from injury 3/21; improving but persistent; low yield from x-ray; rec sleeve to help w/ support as she resumes exercise; not a blood clot; likely bone bruise    Time Documentation    Counseled patient  I spent 44 minutes face to face and 22 minutes non-face to face on today's office visit. Time was spent reviewing patient's previous notes, lab results, test results, vitals, and/or other records as well as examining the patient, providing counseling, coordinating care, answering questions, discussing evaluation and treatment plans, and writing this note.    Total time: 66 minutes      FOLLOW-UP  1. Fasting PE labs ordered -she return next week to have these done  2. RTC for PE in 1 yr unless indicated earlier by lab results; fasting labs the week prior to appt (CBC, CMP, TSH, lipids, UA/micro, vit D)      Electronically signed by:    Fabiana Matthews MD, FACP  05/21/2021

## 2021-05-20 NOTE — ASSESSMENT & PLAN NOTE
Health maintenance - flu recommended for the fall; Td given today - good for 10 yrs (Tdap 3/11); HAV #2 given today; Shingrix recommended; COVID19 vacc done; mammo 2/19/20 at ; GYN/Pap at  [facility]BD (last 3 yrs ago); DEXA 7/20, repeat 2022; colonoscopy 2/5/18, FIT not done last yr, colonosc due 2021 per Dr. Cameron - referral made; eye exam Qyr, UTD; dental exam q6 mos; (+) seat belt use; PE labs ordered

## 2021-05-21 ENCOUNTER — OFFICE VISIT (OUTPATIENT)
Dept: INTERNAL MEDICINE | Facility: CLINIC | Age: 56
End: 2021-05-21

## 2021-05-21 VITALS
HEART RATE: 76 BPM | HEIGHT: 66 IN | WEIGHT: 145 LBS | OXYGEN SATURATION: 98 % | BODY MASS INDEX: 23.3 KG/M2 | SYSTOLIC BLOOD PRESSURE: 112 MMHG | DIASTOLIC BLOOD PRESSURE: 72 MMHG

## 2021-05-21 DIAGNOSIS — Z00.00 PE (PHYSICAL EXAM), ROUTINE: Primary | ICD-10-CM

## 2021-05-21 DIAGNOSIS — M85.89 OSTEOPENIA OF MULTIPLE SITES: Chronic | ICD-10-CM

## 2021-05-21 DIAGNOSIS — Z12.11 SCREENING FOR COLON CANCER: ICD-10-CM

## 2021-05-21 DIAGNOSIS — M25.472 LEFT ANKLE SWELLING: ICD-10-CM

## 2021-05-21 DIAGNOSIS — E55.9 VITAMIN D DEFICIENCY: ICD-10-CM

## 2021-05-21 DIAGNOSIS — E78.2 MIXED HYPERLIPIDEMIA: ICD-10-CM

## 2021-05-21 PROCEDURE — 90632 HEPA VACCINE ADULT IM: CPT | Performed by: INTERNAL MEDICINE

## 2021-05-21 PROCEDURE — 90714 TD VACC NO PRESV 7 YRS+ IM: CPT | Performed by: INTERNAL MEDICINE

## 2021-05-21 PROCEDURE — 93000 ELECTROCARDIOGRAM COMPLETE: CPT | Performed by: INTERNAL MEDICINE

## 2021-05-21 PROCEDURE — 90472 IMMUNIZATION ADMIN EACH ADD: CPT | Performed by: INTERNAL MEDICINE

## 2021-05-21 PROCEDURE — 99396 PREV VISIT EST AGE 40-64: CPT | Performed by: INTERNAL MEDICINE

## 2021-05-21 PROCEDURE — 90471 IMMUNIZATION ADMIN: CPT | Performed by: INTERNAL MEDICINE

## 2021-05-21 PROCEDURE — 99213 OFFICE O/P EST LOW 20 MIN: CPT | Performed by: INTERNAL MEDICINE

## 2021-05-31 ENCOUNTER — HOSPITAL ENCOUNTER (EMERGENCY)
Facility: HOSPITAL | Age: 56
Discharge: HOME | End: 2021-05-31
Attending: EMERGENCY MEDICINE | Admitting: EMERGENCY MEDICINE

## 2021-05-31 VITALS
HEIGHT: 67 IN | SYSTOLIC BLOOD PRESSURE: 121 MMHG | BODY MASS INDEX: 22.76 KG/M2 | WEIGHT: 145 LBS | RESPIRATION RATE: 14 BRPM | DIASTOLIC BLOOD PRESSURE: 76 MMHG | TEMPERATURE: 98.2 F | OXYGEN SATURATION: 97 % | HEART RATE: 75 BPM

## 2021-05-31 DIAGNOSIS — S61.213A LACERATION OF LEFT MIDDLE FINGER WITHOUT FOREIGN BODY WITHOUT DAMAGE TO NAIL, INITIAL ENCOUNTER: Primary | ICD-10-CM

## 2021-05-31 PROCEDURE — 99281 EMR DPT VST MAYX REQ PHY/QHP: CPT

## 2021-05-31 RX ORDER — LIDOCAINE HYDROCHLORIDE 10 MG/ML
5 INJECTION, SOLUTION EPIDURAL; INFILTRATION; INTRACAUDAL; PERINEURAL ONCE
Status: COMPLETED | OUTPATIENT
Start: 2021-05-31 | End: 2021-05-31

## 2021-05-31 RX ADMIN — LIDOCAINE HYDROCHLORIDE 5 ML: 10 INJECTION, SOLUTION EPIDURAL; INFILTRATION; INTRACAUDAL; PERINEURAL at 12:35

## 2021-06-01 DIAGNOSIS — Z12.11 SCREENING FOR COLON CANCER: Primary | ICD-10-CM

## 2021-06-07 ENCOUNTER — LAB (OUTPATIENT)
Dept: LAB | Facility: HOSPITAL | Age: 56
End: 2021-06-07

## 2021-06-07 ENCOUNTER — HOSPITAL ENCOUNTER (EMERGENCY)
Facility: HOSPITAL | Age: 56
Discharge: HOME | End: 2021-06-07

## 2021-06-07 VITALS
TEMPERATURE: 98.2 F | RESPIRATION RATE: 16 BRPM | HEART RATE: 73 BPM | SYSTOLIC BLOOD PRESSURE: 102 MMHG | HEIGHT: 67 IN | DIASTOLIC BLOOD PRESSURE: 69 MMHG | OXYGEN SATURATION: 98 % | WEIGHT: 140 LBS | BODY MASS INDEX: 21.97 KG/M2

## 2021-06-07 DIAGNOSIS — E78.2 MIXED HYPERLIPIDEMIA: ICD-10-CM

## 2021-06-07 DIAGNOSIS — E55.9 VITAMIN D DEFICIENCY: ICD-10-CM

## 2021-06-07 DIAGNOSIS — Z00.00 PE (PHYSICAL EXAM), ROUTINE: ICD-10-CM

## 2021-06-07 LAB
25(OH)D3 SERPL-MCNC: 32.3 NG/ML
ALBUMIN SERPL-MCNC: 4.1 G/DL (ref 3.5–5.2)
ALBUMIN/GLOB SERPL: 1.7 G/DL
ALP SERPL-CCNC: 51 U/L (ref 39–117)
ALT SERPL W P-5'-P-CCNC: 15 U/L (ref 1–33)
ANION GAP SERPL CALCULATED.3IONS-SCNC: 7.9 MMOL/L (ref 5–15)
AST SERPL-CCNC: 16 U/L (ref 1–32)
BACTERIA UR QL AUTO: ABNORMAL /HPF
BASOPHILS # BLD AUTO: 0.07 10*3/MM3 (ref 0–0.2)
BASOPHILS NFR BLD AUTO: 1.3 % (ref 0–1.5)
BILIRUB SERPL-MCNC: 0.9 MG/DL (ref 0–1.2)
BILIRUB UR QL STRIP: NEGATIVE
BUN SERPL-MCNC: 21 MG/DL (ref 6–20)
BUN/CREAT SERPL: 24.7 (ref 7–25)
CALCIUM SPEC-SCNC: 8.8 MG/DL (ref 8.6–10.5)
CHLORIDE SERPL-SCNC: 105 MMOL/L (ref 98–107)
CHOLEST SERPL-MCNC: 194 MG/DL (ref 0–200)
CLARITY UR: CLEAR
CO2 SERPL-SCNC: 26.1 MMOL/L (ref 22–29)
COLOR UR: YELLOW
CREAT SERPL-MCNC: 0.85 MG/DL (ref 0.57–1)
DEPRECATED RDW RBC AUTO: 41.7 FL (ref 37–54)
EOSINOPHIL # BLD AUTO: 0.23 10*3/MM3 (ref 0–0.4)
EOSINOPHIL NFR BLD AUTO: 4.4 % (ref 0.3–6.2)
ERYTHROCYTE [DISTWIDTH] IN BLOOD BY AUTOMATED COUNT: 12.1 % (ref 12.3–15.4)
GFR SERPL CREATININE-BSD FRML MDRD: 69 ML/MIN/1.73
GLOBULIN UR ELPH-MCNC: 2.4 GM/DL
GLUCOSE SERPL-MCNC: 85 MG/DL (ref 65–99)
GLUCOSE UR STRIP-MCNC: NEGATIVE MG/DL
HCT VFR BLD AUTO: 39.3 % (ref 34–46.6)
HDLC SERPL-MCNC: 42 MG/DL (ref 40–60)
HGB BLD-MCNC: 13.7 G/DL (ref 12–15.9)
HGB UR QL STRIP.AUTO: NEGATIVE
HYALINE CASTS UR QL AUTO: ABNORMAL /LPF
IMM GRANULOCYTES # BLD AUTO: 0.01 10*3/MM3 (ref 0–0.05)
IMM GRANULOCYTES NFR BLD AUTO: 0.2 % (ref 0–0.5)
KETONES UR QL STRIP: NEGATIVE
LDLC SERPL CALC-MCNC: 131 MG/DL (ref 0–100)
LDLC/HDLC SERPL: 3.06 {RATIO}
LEUKOCYTE ESTERASE UR QL STRIP.AUTO: ABNORMAL
LYMPHOCYTES # BLD AUTO: 2.21 10*3/MM3 (ref 0.7–3.1)
LYMPHOCYTES NFR BLD AUTO: 41.9 % (ref 19.6–45.3)
MCH RBC QN AUTO: 32.9 PG (ref 26.6–33)
MCHC RBC AUTO-ENTMCNC: 34.9 G/DL (ref 31.5–35.7)
MCV RBC AUTO: 94.2 FL (ref 79–97)
MONOCYTES # BLD AUTO: 0.53 10*3/MM3 (ref 0.1–0.9)
MONOCYTES NFR BLD AUTO: 10 % (ref 5–12)
NEUTROPHILS NFR BLD AUTO: 2.23 10*3/MM3 (ref 1.7–7)
NEUTROPHILS NFR BLD AUTO: 42.2 % (ref 42.7–76)
NITRITE UR QL STRIP: NEGATIVE
NRBC BLD AUTO-RTO: 0 /100 WBC (ref 0–0.2)
PH UR STRIP.AUTO: 5.5 [PH] (ref 5–8)
PLATELET # BLD AUTO: 222 10*3/MM3 (ref 140–450)
PMV BLD AUTO: 10.7 FL (ref 6–12)
POTASSIUM SERPL-SCNC: 4.3 MMOL/L (ref 3.5–5.2)
PROT SERPL-MCNC: 6.5 G/DL (ref 6–8.5)
PROT UR QL STRIP: NEGATIVE
RBC # BLD AUTO: 4.17 10*6/MM3 (ref 3.77–5.28)
RBC # UR: ABNORMAL /HPF
REF LAB TEST METHOD: ABNORMAL
SODIUM SERPL-SCNC: 139 MMOL/L (ref 136–145)
SP GR UR STRIP: 1.03 (ref 1–1.03)
SQUAMOUS #/AREA URNS HPF: ABNORMAL /HPF
TRIGL SERPL-MCNC: 117 MG/DL (ref 0–150)
TSH SERPL DL<=0.05 MIU/L-ACNC: 1.69 UIU/ML (ref 0.27–4.2)
UROBILINOGEN UR QL STRIP: ABNORMAL
VLDLC SERPL-MCNC: 21 MG/DL (ref 5–40)
WBC # BLD AUTO: 5.28 10*3/MM3 (ref 3.4–10.8)
WBC UR QL AUTO: ABNORMAL /HPF

## 2021-06-07 PROCEDURE — 99202 OFFICE O/P NEW SF 15 MIN: CPT

## 2021-06-07 PROCEDURE — 82306 VITAMIN D 25 HYDROXY: CPT

## 2021-06-07 PROCEDURE — 84443 ASSAY THYROID STIM HORMONE: CPT

## 2021-06-07 PROCEDURE — 85025 COMPLETE CBC W/AUTO DIFF WBC: CPT

## 2021-06-07 PROCEDURE — 80053 COMPREHEN METABOLIC PANEL: CPT

## 2021-06-07 PROCEDURE — 81001 URINALYSIS AUTO W/SCOPE: CPT

## 2021-06-07 PROCEDURE — 80061 LIPID PANEL: CPT

## 2021-06-08 NOTE — PROGRESS NOTES
Dear Karime,    Thank you for obtaining the laboratories that we ordered.  I have received those results and would like to review them with you.    Your blood counts, thyroid test, and electrolytes  are within normal limits.  This indicates no anemia, no diabetes, as well as normal thyroid, liver, and kidney function. Your vitamin D level has normalized at 32.3 (goal > 30). With these results, please continue your current dose of vitamin D supplementation.    Your cholesterol profile is borderline elevated, basically unchanged from last year, showing a total cholesterol of 194 (goal < 200).  Your triglycerides are acceptable at 117 with a goal < 150.  Your HDL, the good cholesterol, is low at 42.  HDL is heart protective, and the goal is > 50 in women. The most consistent way to raise HDL is regular aerobic exercise.  Your LDL, the bad cholesterol, is mildly elevated at 131.  Goal for LDL is < 130, ideally < 100.    Let me know if you have any questions or concerns regarding these results.      Sincerely,  Fabiana Matthews MD, FACP

## 2021-07-16 ENCOUNTER — LAB (OUTPATIENT)
Dept: LAB | Facility: HOSPITAL | Age: 56
End: 2021-07-16

## 2021-07-16 ENCOUNTER — OFFICE VISIT (OUTPATIENT)
Dept: INTERNAL MEDICINE | Facility: CLINIC | Age: 56
End: 2021-07-16

## 2021-07-16 VITALS
TEMPERATURE: 98.1 F | BODY MASS INDEX: 22.91 KG/M2 | HEIGHT: 67 IN | HEART RATE: 66 BPM | WEIGHT: 146 LBS | OXYGEN SATURATION: 98 %

## 2021-07-16 DIAGNOSIS — N30.01 ACUTE CYSTITIS WITH HEMATURIA: ICD-10-CM

## 2021-07-16 DIAGNOSIS — B37.31 VAGINAL CANDIDIASIS: ICD-10-CM

## 2021-07-16 DIAGNOSIS — R30.0 DYSURIA: ICD-10-CM

## 2021-07-16 DIAGNOSIS — R30.0 DYSURIA: Primary | ICD-10-CM

## 2021-07-16 LAB
BILIRUB BLD-MCNC: NEGATIVE MG/DL
CLARITY, POC: ABNORMAL
COLOR UR: YELLOW
GLUCOSE UR STRIP-MCNC: NEGATIVE MG/DL
KETONES UR QL: NEGATIVE
LEUKOCYTE EST, POC: ABNORMAL
NITRITE UR-MCNC: POSITIVE MG/ML
PH UR: 6 [PH] (ref 5–8)
PROT UR STRIP-MCNC: NEGATIVE MG/DL
RBC # UR STRIP: ABNORMAL /UL
SP GR UR: 1.02 (ref 1–1.03)
UROBILINOGEN UR QL: NORMAL

## 2021-07-16 PROCEDURE — 87086 URINE CULTURE/COLONY COUNT: CPT

## 2021-07-16 PROCEDURE — 99213 OFFICE O/P EST LOW 20 MIN: CPT | Performed by: HOSPITALIST

## 2021-07-16 PROCEDURE — 81003 URINALYSIS AUTO W/O SCOPE: CPT | Performed by: HOSPITALIST

## 2021-07-16 PROCEDURE — 87077 CULTURE AEROBIC IDENTIFY: CPT

## 2021-07-16 PROCEDURE — 87186 SC STD MICRODIL/AGAR DIL: CPT

## 2021-07-16 RX ORDER — NITROFURANTOIN 25; 75 MG/1; MG/1
100 CAPSULE ORAL 2 TIMES DAILY
Qty: 10 CAPSULE | Refills: 0 | Status: SHIPPED | OUTPATIENT
Start: 2021-07-16 | End: 2021-07-21

## 2021-07-16 RX ORDER — FLUCONAZOLE 150 MG/1
150 TABLET ORAL ONCE
Qty: 1 TABLET | Refills: 1 | Status: SHIPPED | OUTPATIENT
Start: 2021-07-16 | End: 2021-07-16

## 2021-07-16 RX ORDER — PHENAZOPYRIDINE HYDROCHLORIDE 100 MG/1
100 TABLET, FILM COATED ORAL 3 TIMES DAILY PRN
Qty: 6 TABLET | Refills: 0 | Status: SHIPPED | OUTPATIENT
Start: 2021-07-16 | End: 2021-07-18

## 2021-07-16 NOTE — PROGRESS NOTES
"[facility] INTERNAL MEDICINE  [address] [address]  Fax [phone]  Phone [phone]    07/16/2021    Lou Parisi  1965  [id number]    Subjective    Lou Parisi is a 56 y.o. female who comes in today for evaluation of urinary frequency and burning that began yesterday evening.  She has had urinary tract infections in the past but it has been about 10 years since her last one.  She denies abdominal or flank pain.  She does tell me that she is prone to yeast infections when taking oral antibiotics.      The following portions of the patient's history were reviewed and updated as appropriate: allergies, current medications, past family history, past medical history, past social history, past surgical history and problem list.    Review of Systems   Constitutional: Negative for activity change and fever.   HENT: Negative for congestion, ear pain, mouth sores, rhinorrhea and sneezing.    Eyes: Negative for visual disturbance.   Respiratory: Negative for cough, chest tightness, shortness of breath and wheezing.    Cardiovascular: Negative for chest pain.   Gastrointestinal: Negative for abdominal distention, abdominal pain, constipation, diarrhea and nausea.   Genitourinary: Positive for dysuria, frequency, hematuria and urgency. Negative for difficulty urinating and flank pain.   Musculoskeletal: Negative for arthralgias and joint swelling.   Skin: Negative for color change and rash.   Allergic/Immunologic: Negative for environmental allergies.   Neurological: Negative for dizziness, seizures, syncope, weakness, light-headedness and headaches.   Hematological: Negative for adenopathy.   Psychiatric/Behavioral: Negative for agitation, confusion and sleep disturbance. The patient is not nervous/anxious.        Objective    Pulse 66   Temp 98.1 °F (36.7 °C)   Ht 170.2 cm (67\")   Wt 66.2 kg (146 lb)   SpO2 98%   Breastfeeding No   BMI " 22.87 kg/m²    Physical Exam  Constitutional:       General: She is not in acute distress.     Appearance: Normal appearance. She is not ill-appearing.   HENT:      Head: Normocephalic.      Nose: Nose normal. No congestion or rhinorrhea.      Mouth/Throat:      Mouth: Mucous membranes are moist.      Pharynx: Oropharynx is clear. No oropharyngeal exudate or posterior oropharyngeal erythema.   Eyes:      Extraocular Movements: Extraocular movements intact.      Pupils: Pupils are equal, round, and reactive to light.   Cardiovascular:      Rate and Rhythm: Regular rhythm.      Pulses: Normal pulses.      Heart sounds: No murmur heard.     Pulmonary:      Effort: Pulmonary effort is normal. No respiratory distress.   Abdominal:      General: Abdomen is flat. There is no distension.      Palpations: Abdomen is soft.      Tenderness: There is no abdominal tenderness.   Musculoskeletal:         General: No swelling or tenderness. Normal range of motion.      Cervical back: Normal range of motion and neck supple.   Lymphadenopathy:      Cervical: No cervical adenopathy.   Skin:     General: Skin is warm and dry.      Capillary Refill: Capillary refill takes less than 2 seconds.      Findings: No rash.   Neurological:      General: No focal deficit present.      Mental Status: She is alert and oriented to person, place, and time.      Motor: No weakness.      Gait: Gait normal.   Psychiatric:         Mood and Affect: Mood normal.         Behavior: Behavior normal.         Thought Content: Thought content normal.         Procedures    Assessment/Plan  Diagnoses and all orders for this visit:    1. Dysuria (Primary)  -     POCT urinalysis dipstick, automated  -     Urine Culture - Urine, Urine, Clean Catch; Future  -     nitrofurantoin, macrocrystal-monohydrate, (Macrobid) 100 MG capsule; Take 1 capsule by mouth 2 (Two) Times a Day for 5 days.  Dispense: 10 capsule; Refill: 0  -     phenazopyridine (Pyridium) 100 MG tablet;  Take 1 tablet by mouth 3 (Three) Times a Day As Needed for Bladder Spasms.  Dispense: 6 tablet; Refill: 0    2. Acute cystitis with hematuria  -     Urine Culture - Urine, Urine, Clean Catch; Future  -     nitrofurantoin, macrocrystal-monohydrate, (Macrobid) 100 MG capsule; Take 1 capsule by mouth 2 (Two) Times a Day for 5 days.  Dispense: 10 capsule; Refill: 0  -     phenazopyridine (Pyridium) 100 MG tablet; Take 1 tablet by mouth 3 (Three) Times a Day As Needed for Bladder Spasms.  Dispense: 6 tablet; Refill: 0    3. Vaginal candidiasis  -     fluconazole (Diflucan) 150 MG tablet; Take 1 tablet by mouth 1 (One) Time for 1 dose.  Dispense: 1 tablet; Refill: 1    Will send urine for culture.  Prescription for nitrofurantoin and Pyridium for dysuria.  Prescription for fluconazole if needed for yeast infection.  Pt will be traveling to [place] to care for her mother who is now under hospice care.  Instructed patient to follow up in [place] at an urgent care if symptoms worsen or do not improve.       Return if symptoms worsen or fail to improve.    I spent 15 minutes caring for Lou on this date of service. This time includes time spent by me in the following activities: preparing for the visit, reviewing tests, obtaining and/or reviewing a separately obtained history, performing a medically appropriate examination and/or evaluation , counseling and educating the patient/family/caregiver, documenting information in the medical record, independently interpreting results and communicating that information with the patient/family/caregiver and care coordination

## 2021-07-18 LAB — BACTERIA SPEC AEROBE CULT: ABNORMAL

## 2022-02-01 ENCOUNTER — TELEPHONE (OUTPATIENT)
Dept: INTERNAL MEDICINE | Facility: CLINIC | Age: 57
End: 2022-02-01

## 2022-02-01 NOTE — TELEPHONE ENCOUNTER
Pt notified that she can do some salt water gargles, ibuprofen, maybe cough drops might help and warm liquids to help with sore throat. She is on day 5 and this is the only symptom. Advised pt that she would have to be seen by someone in [place] if she feels like she needs medication prescribed. Pt verbalized understanding.

## 2022-02-01 NOTE — TELEPHONE ENCOUNTER
Caller: Lou Parisi    Relationship to patient: Self    Best call back number: [phone]     Date of positive COVID19 test: 1-30-22    COVID19 symptoms: SORE THROAT, CONGESTION    Additional information or concerns: PATIENT STATED THAT SHE IS IN [place].  PATIENT STATED THAT THE SORE THROAT IS LIKE HER THROAT IS ON FIRE.  PATIENT STATED THAT IT IS NOT GOING AWAY.

## 2022-05-19 ENCOUNTER — TELEPHONE (OUTPATIENT)
Dept: INTERNAL MEDICINE | Facility: CLINIC | Age: 57
End: 2022-05-19

## 2022-05-19 DIAGNOSIS — E55.9 VITAMIN D DEFICIENCY: ICD-10-CM

## 2022-05-19 DIAGNOSIS — Z00.00 PE (PHYSICAL EXAM), ROUTINE: Primary | ICD-10-CM

## 2022-05-19 NOTE — TELEPHONE ENCOUNTER
Caller: Lou Parisi    Relationship: Self    Best call back number: [phone]     What orders are you requesting (i.e. lab or imaging): LABS    In what timeframe would the patient need to come in: 5-20-22    Where will you receive your lab/imaging services: [facility]

## 2022-05-22 PROBLEM — Z00.00 PE (PHYSICAL EXAM), ROUTINE: Chronic | Status: ACTIVE | Noted: 2018-01-09

## 2022-05-22 NOTE — PROGRESS NOTES
"Chief Complaint   Patient presents with   • Annual Exam   • Hyperlipidemia       History of Present Illness  57 y.o.  woman presents for updated phys examination and f/u on cholesterol.  Still grieving for her mother's passing back in 2021 with diagnosis of amyloid angiopathy.  Had a slow bleed in the brain, had surgery, lasted for a few months.  Was age 89.  Father remains living.     Has resumed healthy exercise plan.  Has not had a Pap smear in several years.    Review of Systems  Denies headaches, visual changes, CP, palpitations, SOB, cough, abd pain, n/v/d, difficulty with urination, numbness/tingling, falls, mood changes, lightheadedness, hearing changes, rashes.    Denies vaginal discharge or bleeding (no periods) or breast concerns.   All other ROS reviewed and negative.    Current Outpatient Medications:   •  CALCIUM-VITAMIN D PO QD (1000u vit D)  •  Cholecalciferol (VITAMIN D3) 50 MCG ( UT) QD    FH: mother -  10/21 at age 89, brain bleed, amyloid angiopathy    VITALS:  /72   Pulse 59   Ht 170.2 cm (67\")   Wt 65.8 kg (145 lb)   SpO2 99%   BMI 22.71 kg/m²     Physical Exam  Vitals and nursing note reviewed.   Constitutional:       General: She is not in acute distress.     Appearance: Normal appearance. She is well-developed. She is not ill-appearing.   HENT:      Head: Normocephalic.      Right Ear: Tympanic membrane, ear canal and external ear normal.      Left Ear: Tympanic membrane, ear canal and external ear normal.      Nose: Nose normal.   Eyes:      Extraocular Movements: Extraocular movements intact.      Conjunctiva/sclera: Conjunctivae normal.      Pupils: Pupils are equal, round, and reactive to light.   Neck:      Vascular: No carotid bruit (bilaterally).   Cardiovascular:      Rate and Rhythm: Normal rate and regular rhythm.      Heart sounds: Normal heart sounds. No murmur heard.     Comments: 2+ PT pulses bilaterally  Pulmonary:      Effort: Pulmonary " effort is normal. No respiratory distress.      Breath sounds: Normal breath sounds. No wheezing or rales.   Abdominal:      General: Bowel sounds are normal. There is no distension.      Palpations: Abdomen is soft. There is no mass.      Tenderness: There is no abdominal tenderness.   Genitourinary:     Comments: Breast/pelvic exams deferred to GYN    Musculoskeletal:      Cervical back: Normal range of motion and neck supple.      Right lower leg: No edema.      Left lower leg: No edema.   Lymphadenopathy:      Cervical: No cervical adenopathy.   Skin:     General: Skin is warm and dry.      Findings: No rash.   Neurological:      Mental Status: She is alert and oriented to person, place, and time.      Cranial Nerves: No cranial nerve deficit.      Gait: Gait normal.      Deep Tendon Reflexes: Reflexes normal.   Psychiatric:         Mood and Affect: Mood normal.         Behavior: Behavior normal.           LABS  Results for orders placed or performed in visit on 07/16/21   Urine Culture - Urine, Urine, Clean Catch    Specimen: Urine, Clean Catch   Result Value Ref Range    Urine Culture >100,000 CFU/mL Escherichia coli (A)        Susceptibility    Escherichia coli - CANDI     Ampicillin  Susceptible ug/ml     Ampicillin + Sulbactam  Susceptible ug/ml     Cefazolin  Susceptible ug/ml     Cefepime  Susceptible ug/ml     Ceftazidime  Susceptible ug/ml     Ceftriaxone  Susceptible ug/ml     Gentamicin  Susceptible ug/ml     Levofloxacin  Intermediate ug/ml     Nitrofurantoin  Susceptible ug/ml     Piperacillin + Tazobactam  Susceptible ug/ml     Tetracycline  Susceptible ug/ml     Trimethoprim + Sulfamethoxazole  Susceptible ug/ml     6/7/21 lipids , , HDL 42,     5/14/20 lipids , , HDL 42,     ECG 12 Lead    Date/Time: 5/23/2022 2:00 PM  Performed by: Fabiana Matthews MD  Authorized by: Fabiana Matthews MD   Comparison: compared with previous ECG from 5/21/2021  Similar to previous  ECG  Rhythm: sinus rhythm  Rate: normal  BPM: 58  Conduction: incomplete right bundle branch block  ST Segments: ST segments normal  T Waves: T waves normal  QRS axis: left  Other findings: non-specific ST-T wave changes  Clinical impression comment: stable EKG              ASSESSMENT/PLAN    Diagnoses and all orders for this visit:    1. PE (physical exam), routine (Primary)  Assessment & Plan:  Health maintenance - COVID19 vacc done J&J); rec 2nd dose COVID19 booster per recent CDC guidelines - GIVEN TODAY; flu vacc in the fall; Td 5/21 (next Td due 2031); HAV done; Shingrix recommended; mammo due (last 3/4/21 at ); GYN/Pap at  [facility]BD (last 3 yrs ago) - or can update Pap here next yr; DEXA 7/20, repeat after 7/27/22; colonoscopy 2/5/18, overdue for colonosc per Dr. Cameron - colon CA screening goals/options reviewed with patient; eye exam 2022 per pt; dental q6 mos; (+) seat belt use; PE labs ordered      2. Hyperlipidemia  Assessment & Plan:  Update lipids with goal LDL < 130, ideally < 100 and possibly consider goal < 70 with (+) FH early CAD in father; no meds currently    Orders:  -     ECG 12 Lead    3. Vitamin D deficiency  Assessment & Plan:  update vit D level on 2000 units QD supplementation      4. Osteopenia  Assessment & Plan:  Calcium and vitamin D supplementation with weight-bearing exercise; DEXA due after 7/27/22         5. Vaccine counseling  Comments:  rec COVID19 2nd dose with h/o J&J - risks/benefits reviewed, even with h/o prev COVID19 infxn; also rec Shingrix - counseling given    6. Need for COVID-19 vaccine  -     COVID-19 Vaccine (Pfizer) Gray Cap    7. Menopause  -     DEXA Bone Density Axial; Future    Time Documentation    Counseled patient  I spent 42 minutes face to face and 8 minutes non-face to face on today's office visit. Time was spent reviewing patient's previous notes, lab results, test results, vitals, and/or other records as well as examining the patient, providing  counseling, coordinating care, answering questions, discussing evaluation and treatment plans, and writing this note.    I spent 20 minutes on the separately reported service of [id number]. This time is not included in the time used to support the E/M service also reported today.     Total time: 50 minutes      FOLLOW-UP  1. Fasting PE labs ordered - done this AM  2. Will research RFs for amyloid angiopathy (+FH mother)  2. RTC 1yr for annual PE unless lab abnlities; fasting labs the week prior to appt (CBC, CMP, TSH, lipids, UA/micro, vit D)      Electronically signed by:    Fabiana Matthews MD, FACP  05/23/2022

## 2022-05-22 NOTE — ASSESSMENT & PLAN NOTE
Update lipids with goal LDL < 130, ideally < 100 and possibly consider goal < 70 with (+) FH early CAD in father; no meds currently

## 2022-05-22 NOTE — ASSESSMENT & PLAN NOTE
Health maintenance - COVID19 vacc done J&J); rec 2nd dose COVID19 booster per recent CDC guidelines - GIVEN TODAY; flu vacc in the fall; Td 5/21 (next Td due 2031); HAV done; Shingrix recommended; mammo due (last 3/4/21 at ); GYN/Pap at  TBD (last 3 yrs ago) - or can update Pap here next yr; DEXA 7/20, repeat after 7/27/22; colonoscopy 2/5/18, overdue for colonosc per Dr. Cameron - colon CA screening goals/options reviewed with patient; eye exam 2022 per pt; dental q6 mos; (+) seat belt use; PE labs ordered

## 2022-05-23 ENCOUNTER — OFFICE VISIT (OUTPATIENT)
Dept: INTERNAL MEDICINE | Facility: CLINIC | Age: 57
End: 2022-05-23

## 2022-05-23 ENCOUNTER — LAB (OUTPATIENT)
Dept: LAB | Facility: HOSPITAL | Age: 57
End: 2022-05-23

## 2022-05-23 VITALS
DIASTOLIC BLOOD PRESSURE: 72 MMHG | WEIGHT: 145 LBS | HEART RATE: 59 BPM | HEIGHT: 67 IN | SYSTOLIC BLOOD PRESSURE: 118 MMHG | BODY MASS INDEX: 22.76 KG/M2 | OXYGEN SATURATION: 99 %

## 2022-05-23 DIAGNOSIS — Z71.85 VACCINE COUNSELING: ICD-10-CM

## 2022-05-23 DIAGNOSIS — Z23 NEED FOR COVID-19 VACCINE: ICD-10-CM

## 2022-05-23 DIAGNOSIS — E55.9 VITAMIN D DEFICIENCY: Chronic | ICD-10-CM

## 2022-05-23 DIAGNOSIS — Z78.0 MENOPAUSE: Chronic | ICD-10-CM

## 2022-05-23 DIAGNOSIS — M85.89 OSTEOPENIA OF MULTIPLE SITES: Chronic | ICD-10-CM

## 2022-05-23 DIAGNOSIS — R73.01 IMPAIRED FASTING GLUCOSE: ICD-10-CM

## 2022-05-23 DIAGNOSIS — Z00.00 PE (PHYSICAL EXAM), ROUTINE: Primary | Chronic | ICD-10-CM

## 2022-05-23 DIAGNOSIS — E55.9 VITAMIN D DEFICIENCY: ICD-10-CM

## 2022-05-23 DIAGNOSIS — E78.2 MIXED HYPERLIPIDEMIA: Chronic | ICD-10-CM

## 2022-05-23 DIAGNOSIS — Z00.00 PE (PHYSICAL EXAM), ROUTINE: ICD-10-CM

## 2022-05-23 PROBLEM — U07.1 COVID-19 VIRUS INFECTION: Status: ACTIVE | Noted: 2022-05-23

## 2022-05-23 LAB
25(OH)D3 SERPL-MCNC: 37.9 NG/ML (ref 30–100)
ALBUMIN SERPL-MCNC: 4.5 G/DL (ref 3.5–5.2)
ALBUMIN/GLOB SERPL: 2.6 G/DL
ALP SERPL-CCNC: 48 U/L (ref 39–117)
ALT SERPL W P-5'-P-CCNC: 23 U/L (ref 1–33)
ANION GAP SERPL CALCULATED.3IONS-SCNC: 9 MMOL/L (ref 5–15)
AST SERPL-CCNC: 19 U/L (ref 1–32)
BACTERIA UR QL AUTO: NORMAL /HPF
BASOPHILS # BLD AUTO: 0.07 10*3/MM3 (ref 0–0.2)
BASOPHILS NFR BLD AUTO: 1.2 % (ref 0–1.5)
BILIRUB SERPL-MCNC: 0.8 MG/DL (ref 0–1.2)
BILIRUB UR QL STRIP: NEGATIVE
BUN SERPL-MCNC: 16 MG/DL (ref 6–20)
BUN/CREAT SERPL: 21.9 (ref 7–25)
CALCIUM SPEC-SCNC: 9.3 MG/DL (ref 8.6–10.5)
CHLORIDE SERPL-SCNC: 105 MMOL/L (ref 98–107)
CHOLEST SERPL-MCNC: 229 MG/DL (ref 0–200)
CLARITY UR: CLEAR
CO2 SERPL-SCNC: 24 MMOL/L (ref 22–29)
COLOR UR: YELLOW
CREAT SERPL-MCNC: 0.73 MG/DL (ref 0.57–1)
DEPRECATED RDW RBC AUTO: 44.6 FL (ref 37–54)
EGFRCR SERPLBLD CKD-EPI 2021: 95.5 ML/MIN/1.73
EOSINOPHIL # BLD AUTO: 0.23 10*3/MM3 (ref 0–0.4)
EOSINOPHIL NFR BLD AUTO: 4 % (ref 0.3–6.2)
ERYTHROCYTE [DISTWIDTH] IN BLOOD BY AUTOMATED COUNT: 12.6 % (ref 12.3–15.4)
GLOBULIN UR ELPH-MCNC: 1.7 GM/DL
GLUCOSE SERPL-MCNC: 99 MG/DL (ref 65–99)
GLUCOSE UR STRIP-MCNC: NEGATIVE MG/DL
HCT VFR BLD AUTO: 41.7 % (ref 34–46.6)
HDLC SERPL-MCNC: 50 MG/DL (ref 40–60)
HGB BLD-MCNC: 13.9 G/DL (ref 12–15.9)
HGB UR QL STRIP.AUTO: NEGATIVE
HYALINE CASTS UR QL AUTO: NORMAL /LPF
IMM GRANULOCYTES # BLD AUTO: 0.01 10*3/MM3 (ref 0–0.05)
IMM GRANULOCYTES NFR BLD AUTO: 0.2 % (ref 0–0.5)
KETONES UR QL STRIP: NEGATIVE
LDLC SERPL CALC-MCNC: 156 MG/DL (ref 0–100)
LDLC/HDLC SERPL: 3.07 {RATIO}
LEUKOCYTE ESTERASE UR QL STRIP.AUTO: NEGATIVE
LYMPHOCYTES # BLD AUTO: 2.57 10*3/MM3 (ref 0.7–3.1)
LYMPHOCYTES NFR BLD AUTO: 44.9 % (ref 19.6–45.3)
MCH RBC QN AUTO: 32.2 PG (ref 26.6–33)
MCHC RBC AUTO-ENTMCNC: 33.3 G/DL (ref 31.5–35.7)
MCV RBC AUTO: 96.5 FL (ref 79–97)
MONOCYTES # BLD AUTO: 0.5 10*3/MM3 (ref 0.1–0.9)
MONOCYTES NFR BLD AUTO: 8.7 % (ref 5–12)
NEUTROPHILS NFR BLD AUTO: 2.35 10*3/MM3 (ref 1.7–7)
NEUTROPHILS NFR BLD AUTO: 41 % (ref 42.7–76)
NITRITE UR QL STRIP: NEGATIVE
NRBC BLD AUTO-RTO: 0 /100 WBC (ref 0–0.2)
PH UR STRIP.AUTO: 5.5 [PH] (ref 5–8)
PLATELET # BLD AUTO: 232 10*3/MM3 (ref 140–450)
PMV BLD AUTO: 10.7 FL (ref 6–12)
POTASSIUM SERPL-SCNC: 4.3 MMOL/L (ref 3.5–5.2)
PROT SERPL-MCNC: 6.2 G/DL (ref 6–8.5)
PROT UR QL STRIP: NEGATIVE
RBC # BLD AUTO: 4.32 10*6/MM3 (ref 3.77–5.28)
RBC # UR STRIP: NORMAL /HPF
REF LAB TEST METHOD: NORMAL
SODIUM SERPL-SCNC: 138 MMOL/L (ref 136–145)
SP GR UR STRIP: 1.02 (ref 1–1.03)
SQUAMOUS #/AREA URNS HPF: NORMAL /HPF
TRIGL SERPL-MCNC: 128 MG/DL (ref 0–150)
TSH SERPL DL<=0.05 MIU/L-ACNC: 1.76 UIU/ML (ref 0.27–4.2)
UROBILINOGEN UR QL STRIP: NORMAL
VLDLC SERPL-MCNC: 23 MG/DL (ref 5–40)
WBC # UR STRIP: NORMAL /HPF
WBC NRBC COR # BLD: 5.73 10*3/MM3 (ref 3.4–10.8)

## 2022-05-23 PROCEDURE — 93000 ELECTROCARDIOGRAM COMPLETE: CPT | Performed by: INTERNAL MEDICINE

## 2022-05-23 PROCEDURE — 99214 OFFICE O/P EST MOD 30 MIN: CPT | Performed by: INTERNAL MEDICINE

## 2022-05-23 PROCEDURE — 81001 URINALYSIS AUTO W/SCOPE: CPT

## 2022-05-23 PROCEDURE — 80061 LIPID PANEL: CPT

## 2022-05-23 PROCEDURE — 0052A COVID-19 (PFIZER) 12+ YRS: CPT | Performed by: INTERNAL MEDICINE

## 2022-05-23 PROCEDURE — 82306 VITAMIN D 25 HYDROXY: CPT

## 2022-05-23 PROCEDURE — 91305 COVID-19 (PFIZER) 12+ YRS: CPT | Performed by: INTERNAL MEDICINE

## 2022-05-23 PROCEDURE — 99396 PREV VISIT EST AGE 40-64: CPT | Performed by: INTERNAL MEDICINE

## 2022-05-23 PROCEDURE — 80050 GENERAL HEALTH PANEL: CPT

## 2022-05-25 ENCOUNTER — TELEPHONE (OUTPATIENT)
Dept: INTERNAL MEDICINE | Facility: CLINIC | Age: 57
End: 2022-05-25

## 2022-05-25 NOTE — TELEPHONE ENCOUNTER
Pt would like to let you know that brother and sister are on cholesterol meds and father has hx of heart disease. Will continue to watch diet and exercise and repeat lab before next appt.

## 2022-05-25 NOTE — TELEPHONE ENCOUNTER
----- Message from Fabiana Matthews MD sent at 5/23/2022  4:59 PM EDT -----  Regarding: amyloid angiopathy  Research amyloid angiopathy

## 2022-05-25 NOTE — TELEPHONE ENCOUNTER
Will send patient [employer] message with this info - pls ask her to check her [employer] message so you don't have to read this to her.    Amyloid angiopathy is not related to the disease of amyloidosis.    Cerebral amyloid angiopathy is a common cause of hemorrhage in the brain stephanie as patients get older. One study showed prevalence of mod-severe amyloid angiopathy was 36% in patients 90 years of age and older    There is no definite relationship with amyloid angiopathy and high blood pressure, but important to maintain good blood pressures as high blood pressure can cause hemorrhages as well.     Amyloid angiopathy occurs when amyloid beta-peptide deposits occur in the lining of the blood vessels in the brain, making them more susceptible to bleeding. It is the same type as some of the deposits seen Alzheimers although the 2 diseases are different.     What to do at this time is to maintain good blood pressures, cholesterol, sugars, healthy exercise, and healthy nutrition. There is no screening for this as it is rare under age 65; it is common with aging, especially over age 90.

## 2022-05-27 ENCOUNTER — TELEPHONE (OUTPATIENT)
Dept: INTERNAL MEDICINE | Facility: CLINIC | Age: 57
End: 2022-05-27

## 2022-05-27 DIAGNOSIS — R92.8 ABNORMAL MAMMOGRAM: Primary | ICD-10-CM

## 2022-05-27 NOTE — TELEPHONE ENCOUNTER
Patient returned phone call. I notified her of mammogram results. Patient verbalized good understanding and appreciation.

## 2022-05-27 NOTE — TELEPHONE ENCOUNTER
----- Message from Fabiana Matthews MD sent at 5/26/2022  2:05 PM EDT -----  Regarding: test results   Just to confirm, recent mammogram at  was overall normal but showed focal asymmetry on the left side.  She has recommended to obtain additional views for further evaluation.  She should be contacted directly by the  breast center to have this scheduled.  Please let us know if she needs any assistance

## 2022-06-07 ENCOUNTER — TELEPHONE (OUTPATIENT)
Dept: INTERNAL MEDICINE | Facility: CLINIC | Age: 57
End: 2022-06-07

## 2022-06-07 DIAGNOSIS — R92.8 ABNORMAL MAMMOGRAM: Primary | ICD-10-CM

## 2022-06-07 NOTE — TELEPHONE ENCOUNTER
[facility] WITH  [facility] IS CALLING BACK TO REQUEST AN ORDER FOR A LEFT BREAST ULTRASOUND  PLEASE CALL THE  [facility] TO LET SACHA KNOW IF THAT CAN BE DONE

## 2022-06-07 NOTE — TELEPHONE ENCOUNTER
Caller: Lou Parisi    Relationship: Self    Best call back number: [phone]    What orders are you requesting (i.e. lab or imaging): FOR FOLLOW UP MAMMOGRAM, SAW SOMETHING ON FIRST ONE AND WANT ADDITIONAL TEST. LEFT SIDE MAMMOGRAM AND ULTRASOUND.  THEY WON'T SCHEDULE UNTIL THEY RECEIVE THE ORDER, NEW POLICY.    In what timeframe would the patient need to come in: ASAP    Where will you receive your lab/imaging services:  -  [phone]    Additional notes:

## 2022-06-07 NOTE — TELEPHONE ENCOUNTER
PHONE CALL FROM  [facility].  THEY NEED ORDERS FOR LEFT DIAGNOSTIC MAMMOGRAM AND LEFT ULTRASOUND FOR THIS PATIENT. FAX ORDERS -[phone]    CALL IF NEEDED [phone] SACHA

## 2022-06-07 NOTE — TELEPHONE ENCOUNTER
This is the same patient that  requested a diagnostic mammogram for.  states their policy has changed and they need the original ordering provider to order additional imaging. They have the diagnostic mammogram and also need an order for diagnostic US as well. I know in the last note you said they needed to order it but you would do it this one time. The breast center at  is saying they cannot order and it has to be the PCP or the original ordering provider that orders anything additional.

## 2022-06-13 NOTE — TELEPHONE ENCOUNTER
PHONE CALL FROM [facility].  THEY RECEIVED DIAGNOSTIC ORDERS BUT STILL NEED LEFT BREAST ULTRASOUND ORDERS.  PLEASE FAX [phone]    CALL IF NEEDED SACHA @ [phone]

## 2022-08-11 ENCOUNTER — APPOINTMENT (OUTPATIENT)
Dept: BONE DENSITY | Facility: HOSPITAL | Age: 57
End: 2022-08-11

## 2022-08-15 ENCOUNTER — LAB (OUTPATIENT)
Dept: LAB | Facility: HOSPITAL | Age: 57
End: 2022-08-15

## 2022-08-15 DIAGNOSIS — R73.01 IMPAIRED FASTING GLUCOSE: ICD-10-CM

## 2022-08-15 DIAGNOSIS — E78.2 MIXED HYPERLIPIDEMIA: Chronic | ICD-10-CM

## 2022-08-15 LAB
CHOLEST SERPL-MCNC: 247 MG/DL (ref 0–200)
HBA1C MFR BLD: 5.3 % (ref 4.8–5.6)
HDLC SERPL-MCNC: 48 MG/DL (ref 40–60)
LDLC SERPL CALC-MCNC: 176 MG/DL (ref 0–100)
LDLC/HDLC SERPL: 3.61 {RATIO}
TRIGL SERPL-MCNC: 129 MG/DL (ref 0–150)
VLDLC SERPL-MCNC: 23 MG/DL (ref 5–40)

## 2022-08-15 PROCEDURE — 83036 HEMOGLOBIN GLYCOSYLATED A1C: CPT

## 2022-08-15 PROCEDURE — 80061 LIPID PANEL: CPT

## 2022-08-22 ENCOUNTER — OFFICE VISIT (OUTPATIENT)
Dept: INTERNAL MEDICINE | Facility: CLINIC | Age: 57
End: 2022-08-22

## 2022-08-22 VITALS
DIASTOLIC BLOOD PRESSURE: 64 MMHG | BODY MASS INDEX: 22.91 KG/M2 | OXYGEN SATURATION: 98 % | HEART RATE: 59 BPM | SYSTOLIC BLOOD PRESSURE: 118 MMHG | HEIGHT: 67 IN | WEIGHT: 146 LBS

## 2022-08-22 DIAGNOSIS — E78.2 MIXED HYPERLIPIDEMIA: Primary | Chronic | ICD-10-CM

## 2022-08-22 DIAGNOSIS — L23.7 POISON IVY DERMATITIS: ICD-10-CM

## 2022-08-22 DIAGNOSIS — R73.01 IMPAIRED FASTING GLUCOSE: ICD-10-CM

## 2022-08-22 PROCEDURE — 99215 OFFICE O/P EST HI 40 MIN: CPT | Performed by: INTERNAL MEDICINE

## 2022-08-22 RX ORDER — ROSUVASTATIN CALCIUM 10 MG/1
10 TABLET, COATED ORAL DAILY
Qty: 30 TABLET | Refills: 3 | Status: SHIPPED | OUTPATIENT
Start: 2022-08-22 | End: 2022-12-01 | Stop reason: SDUPTHER

## 2022-08-22 RX ORDER — TRIAMCINOLONE ACETONIDE 1 MG/G
CREAM TOPICAL
Qty: 15 G | Refills: 0 | Status: SHIPPED | OUTPATIENT
Start: 2022-08-22 | End: 2022-10-19

## 2022-08-22 NOTE — ASSESSMENT & PLAN NOTE
Lipids much worsened with ; goal LDL < 130, ideally < 100; also note bord 10-yr CVD risk; reviewed med options, goals, side effects, and risks; patient will proceed with rosuvastatin 10mg QD #30, 3RF; (reports mother took rosuvastatin previously - ); decrease saturated fats and cholesterol in the diet; repeat lipids with LFTs, CPK in 3 mos    The 10-year CVD risk score (BRIANNA'Donna, et al., 2008) is: 7%    Values used to calculate the score:      Age: 57 years      Sex: Female      Diabetic: No      Tobacco smoker: No      Systolic Blood Pressure: 118 mmHg      Is BP treated: No      HDL Cholesterol: 48 mg/dL      Total Cholesterol: 247 mg/dL

## 2022-08-22 NOTE — PROGRESS NOTES
"Chief Complaint   Patient presents with   • Hyperlipidemia   • Impaired Fasting Glucose       History of Present Illness  57 y.o.  woman presents for cholesterol and sugar follow-up. States eating fairly healthy - red meat only once every 2 weeks, eggs only on the weekend (#2), lean meats and vegetables for dinner. Reports father and both sibs all with hyperlipidemia. Father with MI at 42, CABG at 52, and now in his 90s.    Has been doing more weight exercises than aerobic but is exercising regularly.    Reports poison ivy inner right ankle, ongoing for 1 week, not spreading but not getting better. Applying calamine lotion.     Review of Systems  Denies CP, palpitations, SOB, headaches.     ROS (+) for poison ivy dermatitis right medial ankle. All other ROS reviewed and negative.    Current Outpatient Medications:   •  CALCIUM-VITAMIN D QD  •  Cholecalciferol (VITAMIN D3) 50 MCG (2000 UT) QD      VITALS:  /64   Pulse 59   Ht 170.2 cm (67\")   Wt 66.2 kg (146 lb)   SpO2 98%   BMI 22.87 kg/m²     Physical Exam  Vitals and nursing note reviewed.   Constitutional:       General: She is not in acute distress.     Appearance: Normal appearance. She is not ill-appearing.   Eyes:      Extraocular Movements: Extraocular movements intact.      Conjunctiva/sclera: Conjunctivae normal.   Pulmonary:      Effort: Pulmonary effort is normal. No respiratory distress.   Skin:     Findings: Rash (cluster of papules right medial ankle, pruritic) present.   Neurological:      Mental Status: She is alert and oriented to person, place, and time. Mental status is at baseline.   Psychiatric:         Mood and Affect: Mood normal.         Behavior: Behavior normal.         LABS  Results for orders placed or performed in visit on 08/15/22   Lipid Panel    Specimen: Blood   Result Value Ref Range    Total Cholesterol 247 (H) 0 - 200 mg/dL    Triglycerides 129 0 - 150 mg/dL    HDL Cholesterol 48 40 - 60 mg/dL    LDL " Cholesterol  176 (H) 0 - 100 mg/dL    VLDL Cholesterol 23 5 - 40 mg/dL    LDL/HDL Ratio 3.61    Hemoglobin A1c    Specimen: Blood   Result Value Ref Range    Hemoglobin A1C 5.30 4.80 - 5.60 %      , FBG 99    ASSESSMENT/PLAN    Diagnoses and all orders for this visit:    1. Hyperlipidemia (Primary)  Assessment & Plan:  Lipids much worsened with ; goal LDL < 130, ideally < 100; also note bord 10-yr CVD risk; reviewed med options, goals, side effects, and risks; patient will proceed with rosuvastatin 10mg QD #30, 3RF; (reports mother took rosuvastatin previously - ); decrease saturated fats and cholesterol in the diet; repeat lipids with LFTs, CPK in 3 mos    The 10-year CVD risk score (Michocaano, et al., 2008) is: 7%    Values used to calculate the score:      Age: 57 years      Sex: Female      Diabetic: No      Tobacco smoker: No      Systolic Blood Pressure: 118 mmHg      Is BP treated: No      HDL Cholesterol: 48 mg/dL      Total Cholesterol: 247 mg/dL      Orders:  -     rosuvastatin (CRESTOR) 10 MG tablet; Take 1 tablet by mouth Daily.  Dispense: 30 tablet; Refill: 3  -     Lipid Panel; Future  -     Hepatic Function Panel; Future  -     CK; Future    2. Impaired fasting glucose  Assessment & Plan:  BG control acceptable with A1C 5.3; f/u with next PE in 2023      3. Poison ivy dermatitis  Comments:  RX TMC 0.1% cream thin layer bid to affected areas x 1 wk, #15gm, 0RF  Orders:  -     triamcinolone (KENALOG) 0.1 % cream; Apply to affected areas in thin layer twice daily as needed  Dispense: 15 g; Refill: 0    Time Documentation    Counseled patient  I spent 34 minutes face to face and 10 minutes non-face to face on today's office visit. Time was spent reviewing patient's previous notes, lab results, test results, vitals, and/or other records as well as examining the patient, providing counseling, coordinating care, answering questions, discussing evaluation and treatment plans, and  writing this note.      Total time: 44 minutes    FOLLOW-UP  1. Health maintenance - COVID19 vacc done (J&J, then Pfizer - done 5/22); rec flu vacc in the fall and new COVID19 vacc when available  2. RTC 3 mos with lipids, LFTs, CPK after starting rosuvastatin 10mg QD    Electronically signed by:    Fabiana Matthews MD, FACP  08/22/2022

## 2022-09-29 ENCOUNTER — HOSPITAL ENCOUNTER (OUTPATIENT)
Dept: BONE DENSITY | Facility: HOSPITAL | Age: 57
Discharge: HOME | End: 2022-09-29
Admitting: INTERNAL MEDICINE

## 2022-09-29 DIAGNOSIS — Z78.0 MENOPAUSE: Chronic | ICD-10-CM

## 2022-09-29 PROCEDURE — 77080 DXA BONE DENSITY AXIAL: CPT

## 2022-10-19 ENCOUNTER — OFFICE VISIT (OUTPATIENT)
Dept: INTERNAL MEDICINE | Facility: CLINIC | Age: 57
End: 2022-10-19

## 2022-10-19 VITALS
OXYGEN SATURATION: 96 % | DIASTOLIC BLOOD PRESSURE: 78 MMHG | HEART RATE: 77 BPM | HEIGHT: 67 IN | BODY MASS INDEX: 23.39 KG/M2 | SYSTOLIC BLOOD PRESSURE: 110 MMHG | TEMPERATURE: 99.4 F | WEIGHT: 149 LBS

## 2022-10-19 DIAGNOSIS — J06.9 VIRAL URI: Primary | ICD-10-CM

## 2022-10-19 LAB
EXPIRATION DATE: NORMAL
EXPIRATION DATE: NORMAL
FLUAV AG UPPER RESP QL IA.RAPID: NOT DETECTED
FLUBV AG UPPER RESP QL IA.RAPID: NOT DETECTED
INTERNAL CONTROL: NORMAL
INTERNAL CONTROL: NORMAL
Lab: NORMAL
Lab: NORMAL
S PYO AG THROAT QL: NEGATIVE
SARS-COV-2 AG UPPER RESP QL IA.RAPID: NOT DETECTED

## 2022-10-19 PROCEDURE — 99214 OFFICE O/P EST MOD 30 MIN: CPT | Performed by: NURSE PRACTITIONER

## 2022-10-19 PROCEDURE — 87428 SARSCOV & INF VIR A&B AG IA: CPT | Performed by: NURSE PRACTITIONER

## 2022-10-19 PROCEDURE — 87880 STREP A ASSAY W/OPTIC: CPT | Performed by: NURSE PRACTITIONER

## 2022-10-19 RX ORDER — BROMPHENIRAMINE MALEATE, PSEUDOEPHEDRINE HYDROCHLORIDE, AND DEXTROMETHORPHAN HYDROBROMIDE 2; 30; 10 MG/5ML; MG/5ML; MG/5ML
5 SYRUP ORAL 4 TIMES DAILY PRN
Qty: 118 ML | Refills: 0 | Status: SHIPPED | OUTPATIENT
Start: 2022-10-19 | End: 2022-10-29

## 2022-10-19 NOTE — PROGRESS NOTES
"  Chief Complaint   Patient presents with   • Cough   • Sore Throat       History of Present Illness    57 y.o.female presents for cough sore throat.    Onset symptoms yesterday \"raw throat\" fever low grade 99.4 cough nonproductive mainly throat that really hurts. No rhinorrhea or sinus pressure. Chills, no headaches, a little body aches. No nausea, vomiting or diarrhea. Tried advil. covid test negative at home. taking emergenC. No sick contacts at home. 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 She is traveling to [place] tomorrow.    Review of Systems   Constitutional: Positive for chills and fever.   HENT: Positive for congestion, postnasal drip and sore throat. Negative for ear pain, rhinorrhea, sinus pressure, sneezing and swollen glands.    Respiratory: Positive for cough. Negative for shortness of breath.    Musculoskeletal: Positive for myalgias.   Neurological: Negative for headache.         PMSFH  The following portions of the patient's history were reviewed and updated as appropriate: allergies, current medications, past family history, past medical history, past social history, past surgical history and problem list.     Past Medical History:   Diagnosis Date   • Hx of bone density study 07/28/2020    DEXA (7/28/20) L -0.4, H-1.2, repeat 2-3 yrs   • Hx of bone density study 09/29/2022    DEXA (9/29/22) L -0.5, H -1.3, repeat 2-3 yrs   • Hx of chest x-ray 01/05/2018    nl CXR (1/5/18): min degen changes   • Hx of colonoscopy 02/05/2018    colonosc (2/5/18): diverticulosis, repeat 3 yrs (but rec FIT in 1 yr); GI - Dr. Cameron   • Nose fracture 10/2014    Description: secondary to acorn trauma (10/14); ENT - Dr. Solo   • Tendinitis of left rotator cuff 12/18/2015    Impression: 12/18/2015 - sxs ongoing > 1 month; referral given for PT; also recommend prn NSAIDs, stephanie prior to PT session (advil 3 tid prn with food);       No Known Allergies   Social History     Tobacco Use   • Smoking status: Never   • " "Smokeless tobacco: Never   Vaping Use   • Vaping Use: Never used   Substance Use Topics   • Alcohol use: Yes     Comment: social   • Drug use: No           Current Outpatient Medications:   •  CALCIUM-VITAMIN D PO, Take  by mouth Daily. 1000 units vit D, Disp: , Rfl:   •  Cholecalciferol (VITAMIN D3) 50 MCG (2000 UT) capsule, Take 1 capsule by mouth Daily., Disp: 30 capsule, Rfl: 11  •  rosuvastatin (CRESTOR) 10 MG tablet, Take 1 tablet by mouth Daily., Disp: 30 tablet, Rfl: 3    VITALS:  /78   Pulse 77   Temp 99.4 °F (37.4 °C)   Ht 170.2 cm (67.01\")   Wt 67.6 kg (149 lb)   SpO2 96%   BMI 23.33 kg/m²     Physical Exam  Vitals reviewed.   Constitutional:       General: She is not in acute distress.     Appearance: She is not diaphoretic.   HENT:      Head: Normocephalic.      Right Ear: Tympanic membrane, ear canal and external ear normal.      Left Ear: Ear canal and external ear normal.      Nose: Nose normal. No congestion or rhinorrhea.      Mouth/Throat:      Lips: Pink.      Mouth: Mucous membranes are moist.      Tongue: No lesions.      Palate: No lesions.      Pharynx: Uvula midline. Posterior oropharyngeal erythema present. No pharyngeal swelling, oropharyngeal exudate or uvula swelling.      Comments: Clearing throat  Musculoskeletal:      Cervical back: Normal range of motion and neck supple.   Lymphadenopathy:      Cervical: No cervical adenopathy.   Skin:     Findings: No rash.   Neurological:      Mental Status: She is alert.         Result Review :            Assessment and Plan    Diagnoses and all orders for this visit:    1. Viral URI (Primary)  -     POCT rapid strep A  -     POCT SARS-CoV-2 Antigen CASS + Flu  -     brompheniramine-pseudoephedrine-DM 30-2-10 MG/5ML syrup; Take 5 mL by mouth 4 (Four) Times a Day As Needed for Congestion, Cough or Allergies.  Dispense: 118 mL; Refill: 0    negative covid, flu, and strep test.  mostly likely common viral uri; less than 24 hrs symptoms. " Fever low grade. No sinus pressure or purulence. Would focus on symptom control with bromfed, tylenol/ibuprofen, warm salt water gargles, throat lozenges can try zinc lozenges, hydration, cont vitamin C. If worsening symptoms to let me know or seek urgent care.    I discussed the patients findings and my recommendations with patient.  Patient was encouraged to keep me informed of any acute changes, lack of improvement, or any new concerning symptoms.  Patient voiced understanding of all instructions and denied further questions.      Follow Up   Return if symptoms worsen or fail to improve.      Electronically signed by:    NIRMALA Howard  10/19/2022

## 2022-11-03 ENCOUNTER — OFFICE VISIT (OUTPATIENT)
Dept: INTERNAL MEDICINE | Facility: CLINIC | Age: 57
End: 2022-11-03

## 2022-11-03 VITALS
WEIGHT: 149 LBS | DIASTOLIC BLOOD PRESSURE: 70 MMHG | TEMPERATURE: 99 F | BODY MASS INDEX: 23.33 KG/M2 | HEART RATE: 68 BPM | RESPIRATION RATE: 16 BRPM | OXYGEN SATURATION: 96 % | SYSTOLIC BLOOD PRESSURE: 110 MMHG

## 2022-11-03 DIAGNOSIS — J02.9 SORE THROAT: Primary | ICD-10-CM

## 2022-11-03 LAB
EXPIRATION DATE: NORMAL
INTERNAL CONTROL: NORMAL
Lab: NORMAL
S PYO AG THROAT QL: NEGATIVE

## 2022-11-03 PROCEDURE — 99213 OFFICE O/P EST LOW 20 MIN: CPT | Performed by: NURSE PRACTITIONER

## 2022-11-03 PROCEDURE — 87880 STREP A ASSAY W/OPTIC: CPT | Performed by: NURSE PRACTITIONER

## 2022-11-03 NOTE — PROGRESS NOTES
Follow Up Office Visit      Date: 2022   Patient Name: Lou Parisi  : 1965   MRN: [id number]     Chief Complaint:    Chief Complaint   Patient presents with   • Cough     States having swelling in throat not going away from 3 weeks prior, causing cough. Tested for strep, covid and flu on 10/19/22 all negative   • Sore Throat       History of Present Illness: Lou Parisi is a 57 y.o. female who is here today to follow up. She was seen in our office 10-19-22 for sore throat and fever. At that time covid, flu, and strep testing all negative. Symptoms have remained persistent and she is here today for re-evaluation.  Overall, symptoms are slightly improved. The cough is much improved. She is no longer using cough syrup. She is still using advil occasionally, when needed, but is trying to not take anything.       Subjective      Review of Systems:   Review of Systems   Constitutional: Positive for fatigue. Negative for activity change, appetite change and fever.   HENT: Positive for sore throat. Negative for congestion, ear pain, postnasal drip, rhinorrhea and sneezing.    Respiratory: Negative for cough and shortness of breath.    Skin: Negative for rash.       I have reviewed the patients family history, social history, past medical history, past surgical history and have updated it as appropriate.     Medications:     Current Outpatient Medications:   •  brompheniramine-pseudoephedrine-DM 30-2-10 MG/5ML syrup, Take 5 mL by mouth 4 (Four) Times a Day As Needed for Congestion, Cough or Allergies., Disp: 118 mL, Rfl: 0  •  CALCIUM-VITAMIN D PO, Take  by mouth Daily. 1000 units vit D, Disp: , Rfl:   •  Cholecalciferol (VITAMIN D3) 50 MCG (2000 UT) capsule, Take 1 capsule by mouth Daily., Disp: 30 capsule, Rfl: 11  •  rosuvastatin (CRESTOR) 10 MG tablet, Take 1 tablet by mouth Daily., Disp: 30 tablet, Rfl: 3    Allergies:   No Known Allergies    Objective     Physical Exam: Please  see above  Vital Signs:   Vitals:    11/03/22 1650   BP: 110/70   Pulse: 68   Resp: 16   Temp: 99 °F (37.2 °C)   SpO2: 96%   Weight: 67.6 kg (149 lb)   PainSc:   4     Body mass index is 23.33 kg/m².    Physical Exam  Vitals and nursing note reviewed.   Constitutional:       General: She is not in acute distress.     Appearance: Normal appearance. She is normal weight. She is not ill-appearing.   HENT:      Head: Normocephalic and atraumatic.      Right Ear: Tympanic membrane, ear canal and external ear normal.      Left Ear: Tympanic membrane, ear canal and external ear normal.      Nose: Nose normal. No congestion or rhinorrhea.      Mouth/Throat:      Mouth: Mucous membranes are moist.      Pharynx: Oropharynx is clear. No oropharyngeal exudate or posterior oropharyngeal erythema.   Eyes:      Conjunctiva/sclera: Conjunctivae normal.      Pupils: Pupils are equal, round, and reactive to light.   Cardiovascular:      Rate and Rhythm: Normal rate and regular rhythm.      Heart sounds: Normal heart sounds.   Pulmonary:      Effort: Pulmonary effort is normal. No respiratory distress.      Breath sounds: Normal breath sounds.   Musculoskeletal:      Cervical back: Neck supple.   Lymphadenopathy:      Cervical: No cervical adenopathy.   Skin:     General: Skin is warm and dry.      Capillary Refill: Capillary refill takes less than 2 seconds.   Neurological:      General: No focal deficit present.      Mental Status: She is alert and oriented to person, place, and time. Mental status is at baseline.         Procedures    Results:   Imaging:     Labs:       Assessment / Plan      Assessment/Plan:   Diagnoses and all orders for this visit:    1. Sore throat (Primary)  -     Cancel: POCT SARS-CoV-2 Antigen CASS + Flu  -     POCT rapid strep A: negative  - recommend antihistamine, nasal saline spray 2-3x/day, steroid nasal spray (OTC flonase, nasacort, or rhinocort) 2 sprays/nostril QAM - reviewed how to use correctly; and  prn mucinex (or mucinex DM); aggressive fluid intake and adequate rest; f/u as needed         Follow Up:   Return if symptoms worsen or fail to improve, for Next scheduled follow up.    NIRMALA Hodges  [facility] [place]

## 2022-11-30 ENCOUNTER — LAB (OUTPATIENT)
Dept: LAB | Facility: HOSPITAL | Age: 57
End: 2022-11-30

## 2022-11-30 DIAGNOSIS — E78.2 MIXED HYPERLIPIDEMIA: Chronic | ICD-10-CM

## 2022-11-30 LAB
ALBUMIN SERPL-MCNC: 4.4 G/DL (ref 3.5–5.2)
ALP SERPL-CCNC: 52 U/L (ref 39–117)
ALT SERPL W P-5'-P-CCNC: 24 U/L (ref 1–33)
AST SERPL-CCNC: 21 U/L (ref 1–32)
BILIRUB CONJ SERPL-MCNC: <0.2 MG/DL (ref 0–0.3)
BILIRUB INDIRECT SERPL-MCNC: NORMAL MG/DL
BILIRUB SERPL-MCNC: 1 MG/DL (ref 0–1.2)
CHOLEST SERPL-MCNC: 154 MG/DL (ref 0–200)
CK SERPL-CCNC: 79 U/L (ref 20–180)
HDLC SERPL-MCNC: 54 MG/DL (ref 40–60)
LDLC SERPL CALC-MCNC: 79 MG/DL (ref 0–100)
LDLC/HDLC SERPL: 1.42 {RATIO}
PROT SERPL-MCNC: 6.6 G/DL (ref 6–8.5)
TRIGL SERPL-MCNC: 117 MG/DL (ref 0–150)
VLDLC SERPL-MCNC: 21 MG/DL (ref 5–40)

## 2022-11-30 PROCEDURE — 80061 LIPID PANEL: CPT

## 2022-11-30 PROCEDURE — 80076 HEPATIC FUNCTION PANEL: CPT

## 2022-11-30 PROCEDURE — 82550 ASSAY OF CK (CPK): CPT

## 2022-12-01 NOTE — PROGRESS NOTES
"Chief Complaint   Patient presents with   • Hyperlipidemia       History of Present Illness  57 y.o.  woman presents for cholesterol f/u after starting rosuvastatin. Tolerating med without s/e.    Review of Systems  Denies CP, palpitations, SOB. All other ROS reviewed and negative.    Current Outpatient Medications:   •  CALCIUM-VITAMIN D QD  •  Cholecalciferol (VITAMIN D3) 50 MCG (2000 UT) QD  •  rosuvastatin (CRESTOR) 10 MG QD    VITALS:  /62   Pulse 62   Ht 170.2 cm (67\")   Wt 66.7 kg (147 lb)   SpO2 99%   BMI 23.02 kg/m²     Physical Exam  Vitals and nursing note reviewed.   Constitutional:       General: She is not in acute distress.     Appearance: Normal appearance. She is not ill-appearing.   Eyes:      Extraocular Movements: Extraocular movements intact.      Conjunctiva/sclera: Conjunctivae normal.   Pulmonary:      Effort: Pulmonary effort is normal. No respiratory distress.   Neurological:      Mental Status: She is alert and oriented to person, place, and time. Mental status is at baseline.   Psychiatric:         Mood and Affect: Mood normal.         Behavior: Behavior normal.         LABS  Results for orders placed or performed in visit on 11/30/22   Lipid Panel    Specimen: Blood   Result Value Ref Range    Total Cholesterol 154 0 - 200 mg/dL    Triglycerides 117 0 - 150 mg/dL    HDL Cholesterol 54 40 - 60 mg/dL    LDL Cholesterol  79 0 - 100 mg/dL    VLDL Cholesterol 21 5 - 40 mg/dL    LDL/HDL Ratio 1.42    Hepatic Function Panel    Specimen: Blood   Result Value Ref Range    Total Protein 6.6 6.0 - 8.5 g/dL    Albumin 4.40 3.50 - 5.20 g/dL    ALT (SGPT) 24 1 - 33 U/L    AST (SGOT) 21 1 - 32 U/L    Alkaline Phosphatase 52 39 - 117 U/L    Total Bilirubin 1.0 0.0 - 1.2 mg/dL    Bilirubin, Direct <0.2 0.0 - 0.3 mg/dL    Bilirubin, Indirect     CK    Specimen: Blood   Result Value Ref Range    Creatine Kinase 79 20 - 180 U/L     8/22 , A1C 5.3    ASSESSMENT/PLAN    Diagnoses " and all orders for this visit:    1. Hyperlipidemia (Primary)  Assessment & Plan:  Lipids improved with LDL 79; goal LDL < 130, ideally < 100; cont rosuvastatin 10mg QD #90, 1RF    Orders:  -     rosuvastatin (CRESTOR) 10 MG tablet; Take 1 tablet by mouth Daily.  Dispense: 90 tablet; Refill: 1    2. Need for influenza vaccination  -     FluLaval/Fluarix/Fluzone >6 Months    3. Vaccine counseling  Comments:  strongly rec updated COVID19 vacc, stephanie will be taking care of father (GIVEN TODAY)  Orders:  -     COVID-19 Bivalent Booster (Pfizer) 12+yrs      FOLLOW-UP  1. Health maintenance - COVID19 vacc done, incl booster; rec proceeding with new COVID19 vacc and updated flu vacc (BOTH GIVEN TODAY)  2. RTC for next PE 6/1/23; fasting labs prior to appt (CBC, CMP, TSH, lipids, UA/micr, vit D, A1C)    Electronically signed by:    Fabiana Matthews MD, FACP  12/02/2022

## 2022-12-02 ENCOUNTER — OFFICE VISIT (OUTPATIENT)
Dept: INTERNAL MEDICINE | Facility: CLINIC | Age: 57
End: 2022-12-02

## 2022-12-02 VITALS
SYSTOLIC BLOOD PRESSURE: 112 MMHG | WEIGHT: 147 LBS | BODY MASS INDEX: 23.07 KG/M2 | OXYGEN SATURATION: 99 % | HEIGHT: 67 IN | HEART RATE: 62 BPM | DIASTOLIC BLOOD PRESSURE: 62 MMHG

## 2022-12-02 DIAGNOSIS — E78.2 MIXED HYPERLIPIDEMIA: Primary | Chronic | ICD-10-CM

## 2022-12-02 DIAGNOSIS — Z71.85 VACCINE COUNSELING: ICD-10-CM

## 2022-12-02 DIAGNOSIS — Z23 NEED FOR INFLUENZA VACCINATION: ICD-10-CM

## 2022-12-02 PROCEDURE — 90686 IIV4 VACC NO PRSV 0.5 ML IM: CPT | Performed by: INTERNAL MEDICINE

## 2022-12-02 PROCEDURE — 99213 OFFICE O/P EST LOW 20 MIN: CPT | Performed by: INTERNAL MEDICINE

## 2022-12-02 PROCEDURE — 91312 COVID-19 (PFIZER) BIVALENT BOOSTER 12+YRS: CPT | Performed by: INTERNAL MEDICINE

## 2022-12-02 PROCEDURE — 90471 IMMUNIZATION ADMIN: CPT | Performed by: INTERNAL MEDICINE

## 2022-12-02 PROCEDURE — 0124A COVID-19 (PFIZER) BIVALENT BOOSTER 12+YRS: CPT | Performed by: INTERNAL MEDICINE

## 2022-12-02 RX ORDER — ROSUVASTATIN CALCIUM 10 MG/1
10 TABLET, COATED ORAL DAILY
Qty: 90 TABLET | Refills: 1 | Status: SHIPPED | OUTPATIENT
Start: 2022-12-02

## 2022-12-02 NOTE — PROGRESS NOTES
Immunization  Immunization performed in left deltoid by Agueda Feldman MA. Patient tolerated the procedure well without complications.  12/02/22   Agueda Feldman MA

## 2022-12-31 DIAGNOSIS — E78.2 MIXED HYPERLIPIDEMIA: Chronic | ICD-10-CM

## 2023-01-03 RX ORDER — ROSUVASTATIN CALCIUM 10 MG/1
10 TABLET, COATED ORAL DAILY
Qty: 30 TABLET | OUTPATIENT
Start: 2023-01-03

## 2023-04-07 DIAGNOSIS — E78.2 MIXED HYPERLIPIDEMIA: Chronic | ICD-10-CM

## 2023-04-07 RX ORDER — ROSUVASTATIN CALCIUM 10 MG/1
10 TABLET, COATED ORAL DAILY
Qty: 90 TABLET | Refills: 1 | OUTPATIENT
Start: 2023-04-07

## 2023-05-26 DIAGNOSIS — R73.01 IMPAIRED FASTING GLUCOSE: ICD-10-CM

## 2023-05-26 DIAGNOSIS — Z00.00 PE (PHYSICAL EXAM), ROUTINE: Primary | Chronic | ICD-10-CM

## 2023-05-26 DIAGNOSIS — E78.2 MIXED HYPERLIPIDEMIA: Chronic | ICD-10-CM

## 2023-05-26 DIAGNOSIS — E55.9 VITAMIN D DEFICIENCY: Chronic | ICD-10-CM

## 2023-05-28 PROBLEM — R73.01 IMPAIRED FASTING GLUCOSE: Chronic | Status: ACTIVE | Noted: 2022-05-23

## 2023-05-29 NOTE — ASSESSMENT & PLAN NOTE
Health maintenance - COVID19 vacc done, incl bivalent vaccine; flu 11/22; Td 5/21 (next Td due 2031); HAV done; rec Shingrix - counseling given; mammo due (last 5/20/22 (), 6/22 L. Dx); GYN/Pap at  TBD (last 5 yrs ago) - reviewed guidelines to repeat Q3yrs until age 65, unless abnlities; DEXA 9/22, repeat 2025; colonoscopy overdue (last 2/5/18, repeat 2021 per Dr. Cameron) - referral made; eye exam pending (wears contacts); dental q6 mos; (+) seat belt use; counseling re: exercise and social activities

## 2023-05-30 ENCOUNTER — LAB (OUTPATIENT)
Dept: LAB | Facility: HOSPITAL | Age: 58
End: 2023-05-30

## 2023-05-30 DIAGNOSIS — E78.2 MIXED HYPERLIPIDEMIA: Chronic | ICD-10-CM

## 2023-05-30 DIAGNOSIS — R73.01 IMPAIRED FASTING GLUCOSE: ICD-10-CM

## 2023-05-30 DIAGNOSIS — E55.9 VITAMIN D DEFICIENCY: Chronic | ICD-10-CM

## 2023-05-30 DIAGNOSIS — Z00.00 PE (PHYSICAL EXAM), ROUTINE: ICD-10-CM

## 2023-05-30 LAB
25(OH)D3 SERPL-MCNC: 54.7 NG/ML (ref 30–100)
ALBUMIN SERPL-MCNC: 4.6 G/DL (ref 3.5–5.2)
ALBUMIN/GLOB SERPL: 2.6 G/DL
ALP SERPL-CCNC: 46 U/L (ref 39–117)
ALT SERPL W P-5'-P-CCNC: 24 U/L (ref 1–33)
ANION GAP SERPL CALCULATED.3IONS-SCNC: 12.9 MMOL/L (ref 5–15)
AST SERPL-CCNC: 20 U/L (ref 1–32)
BACTERIA UR QL AUTO: NORMAL /HPF
BASOPHILS # BLD AUTO: 0.05 10*3/MM3 (ref 0–0.2)
BASOPHILS NFR BLD AUTO: 0.9 % (ref 0–1.5)
BILIRUB SERPL-MCNC: 0.9 MG/DL (ref 0–1.2)
BILIRUB UR QL STRIP: NEGATIVE
BUN SERPL-MCNC: 10 MG/DL (ref 6–20)
BUN/CREAT SERPL: 11.9 (ref 7–25)
CALCIUM SPEC-SCNC: 9.8 MG/DL (ref 8.6–10.5)
CHLORIDE SERPL-SCNC: 107 MMOL/L (ref 98–107)
CHOLEST SERPL-MCNC: 135 MG/DL (ref 0–200)
CLARITY UR: CLEAR
CO2 SERPL-SCNC: 24.1 MMOL/L (ref 22–29)
COLOR UR: YELLOW
CREAT SERPL-MCNC: 0.84 MG/DL (ref 0.57–1)
DEPRECATED RDW RBC AUTO: 42.2 FL (ref 37–54)
EGFRCR SERPLBLD CKD-EPI 2021: 80.2 ML/MIN/1.73
EOSINOPHIL # BLD AUTO: 0.15 10*3/MM3 (ref 0–0.4)
EOSINOPHIL NFR BLD AUTO: 2.6 % (ref 0.3–6.2)
ERYTHROCYTE [DISTWIDTH] IN BLOOD BY AUTOMATED COUNT: 12.5 % (ref 12.3–15.4)
GLOBULIN UR ELPH-MCNC: 1.8 GM/DL
GLUCOSE SERPL-MCNC: 92 MG/DL (ref 65–99)
GLUCOSE UR STRIP-MCNC: NEGATIVE MG/DL
HBA1C MFR BLD: 5.2 % (ref 4.8–5.6)
HCT VFR BLD AUTO: 39.9 % (ref 34–46.6)
HDLC SERPL-MCNC: 50 MG/DL (ref 40–60)
HGB BLD-MCNC: 13.6 G/DL (ref 12–15.9)
HGB UR QL STRIP.AUTO: NEGATIVE
HYALINE CASTS UR QL AUTO: NORMAL /LPF
IMM GRANULOCYTES # BLD AUTO: 0.01 10*3/MM3 (ref 0–0.05)
IMM GRANULOCYTES NFR BLD AUTO: 0.2 % (ref 0–0.5)
KETONES UR QL STRIP: NEGATIVE
LDLC SERPL CALC-MCNC: 70 MG/DL (ref 0–100)
LDLC/HDLC SERPL: 1.4 {RATIO}
LEUKOCYTE ESTERASE UR QL STRIP.AUTO: ABNORMAL
LYMPHOCYTES # BLD AUTO: 2.74 10*3/MM3 (ref 0.7–3.1)
LYMPHOCYTES NFR BLD AUTO: 48.3 % (ref 19.6–45.3)
MCH RBC QN AUTO: 31.9 PG (ref 26.6–33)
MCHC RBC AUTO-ENTMCNC: 34.1 G/DL (ref 31.5–35.7)
MCV RBC AUTO: 93.4 FL (ref 79–97)
MONOCYTES # BLD AUTO: 0.49 10*3/MM3 (ref 0.1–0.9)
MONOCYTES NFR BLD AUTO: 8.6 % (ref 5–12)
NEUTROPHILS NFR BLD AUTO: 2.23 10*3/MM3 (ref 1.7–7)
NEUTROPHILS NFR BLD AUTO: 39.4 % (ref 42.7–76)
NITRITE UR QL STRIP: NEGATIVE
NRBC BLD AUTO-RTO: 0 /100 WBC (ref 0–0.2)
PH UR STRIP.AUTO: 7 [PH] (ref 5–8)
PLATELET # BLD AUTO: 211 10*3/MM3 (ref 140–450)
PMV BLD AUTO: 10.5 FL (ref 6–12)
POTASSIUM SERPL-SCNC: 4.5 MMOL/L (ref 3.5–5.2)
PROT SERPL-MCNC: 6.4 G/DL (ref 6–8.5)
PROT UR QL STRIP: NEGATIVE
RBC # BLD AUTO: 4.27 10*6/MM3 (ref 3.77–5.28)
RBC # UR STRIP: NORMAL /HPF
REF LAB TEST METHOD: NORMAL
SODIUM SERPL-SCNC: 144 MMOL/L (ref 136–145)
SP GR UR STRIP: 1.01 (ref 1–1.03)
SQUAMOUS #/AREA URNS HPF: NORMAL /HPF
TRIGL SERPL-MCNC: 74 MG/DL (ref 0–150)
TSH SERPL DL<=0.05 MIU/L-ACNC: 2.64 UIU/ML (ref 0.27–4.2)
UROBILINOGEN UR QL STRIP: ABNORMAL
VLDLC SERPL-MCNC: 15 MG/DL (ref 5–40)
WBC # UR STRIP: NORMAL /HPF
WBC NRBC COR # BLD: 5.67 10*3/MM3 (ref 3.4–10.8)

## 2023-05-30 PROCEDURE — 80050 GENERAL HEALTH PANEL: CPT

## 2023-05-30 PROCEDURE — 81001 URINALYSIS AUTO W/SCOPE: CPT

## 2023-05-30 PROCEDURE — 83036 HEMOGLOBIN GLYCOSYLATED A1C: CPT

## 2023-05-30 PROCEDURE — 82306 VITAMIN D 25 HYDROXY: CPT

## 2023-05-30 PROCEDURE — 80061 LIPID PANEL: CPT

## 2023-06-01 ENCOUNTER — OFFICE VISIT (OUTPATIENT)
Dept: INTERNAL MEDICINE | Facility: CLINIC | Age: 58
End: 2023-06-01

## 2023-06-01 VITALS
BODY MASS INDEX: 22.76 KG/M2 | SYSTOLIC BLOOD PRESSURE: 110 MMHG | WEIGHT: 145 LBS | HEIGHT: 67 IN | OXYGEN SATURATION: 98 % | DIASTOLIC BLOOD PRESSURE: 60 MMHG

## 2023-06-01 DIAGNOSIS — E55.9 VITAMIN D DEFICIENCY: Chronic | ICD-10-CM

## 2023-06-01 DIAGNOSIS — R10.13 DYSPEPSIA: ICD-10-CM

## 2023-06-01 DIAGNOSIS — E78.2 MIXED HYPERLIPIDEMIA: Chronic | ICD-10-CM

## 2023-06-01 DIAGNOSIS — R73.01 IMPAIRED FASTING GLUCOSE: ICD-10-CM

## 2023-06-01 DIAGNOSIS — Z12.11 SCREENING FOR COLON CANCER: ICD-10-CM

## 2023-06-01 DIAGNOSIS — Z00.00 PE (PHYSICAL EXAM), ROUTINE: Primary | Chronic | ICD-10-CM

## 2023-06-01 DIAGNOSIS — R19.8 BOWEL MOVEMENT SYMPTOM: ICD-10-CM

## 2023-06-01 DIAGNOSIS — M85.89 OSTEOPENIA OF MULTIPLE SITES: Chronic | ICD-10-CM

## 2023-06-01 PROCEDURE — 93000 ELECTROCARDIOGRAM COMPLETE: CPT | Performed by: INTERNAL MEDICINE

## 2023-06-01 PROCEDURE — 99396 PREV VISIT EST AGE 40-64: CPT | Performed by: INTERNAL MEDICINE

## 2023-06-01 RX ORDER — ROSUVASTATIN CALCIUM 10 MG/1
10 TABLET, COATED ORAL DAILY
Qty: 90 TABLET | Refills: 3 | Status: SHIPPED | OUTPATIENT
Start: 2023-06-01

## 2023-06-01 NOTE — ASSESSMENT & PLAN NOTE
BG control good with A1C 5.2; encouraged reg phys activity to decr insulin resistance, moderation in unhealthy starches/sweets; f/u A1C in 12 mos

## 2023-07-26 ENCOUNTER — TREATMENT (OUTPATIENT)
Dept: PHYSICAL THERAPY | Facility: CLINIC | Age: 58
End: 2023-07-26
Payer: COMMERCIAL

## 2023-07-26 DIAGNOSIS — M77.12 LATERAL EPICONDYLITIS OF LEFT ELBOW: ICD-10-CM

## 2023-07-26 DIAGNOSIS — M25.522 LEFT ELBOW PAIN: Primary | ICD-10-CM

## 2023-07-26 PROCEDURE — 97140 MANUAL THERAPY 1/> REGIONS: CPT | Performed by: PHYSICAL THERAPIST

## 2023-07-26 PROCEDURE — 97112 NEUROMUSCULAR REEDUCATION: CPT | Performed by: PHYSICAL THERAPIST

## 2023-07-26 PROCEDURE — 97110 THERAPEUTIC EXERCISES: CPT | Performed by: PHYSICAL THERAPIST

## 2023-07-26 NOTE — PROGRESS NOTES
Physical Therapy Daily Progress Note    Subjective   Lou Parisi reports: she is doing better. She can open a jar pain free now.      Objective   See Exercise, Manual, and Modality Logs for complete treatment.       Assessment/Plan  Pt tolerated treatment well. She had mild pain with wrist extension activities. She is progressing well and would benefit from continued skilled PT.    Progress per Plan of Care           Manual Therapy:    16     mins  14827;  Therapeutic Exercise:    23     mins  77605;     Neuromuscular Bonilla:    10    mins  57164;    Therapeutic Activity:          mins  69919;     Gait Training:           mins  27974;     Ultrasound:          mins  97747;    Electrical Stimulation:         mins  46465 ( );  E-Stim Attended:         mins  76692  Iontophoresis          mins 48936   Traction          mins  89648  Fluidotherapy          mins  64984  Dry Needling          mins self-pay - No Charge  Paraffin          mins  89762    Timed Treatment:   49   mins   Total Treatment:     49   mins    Grazyna Pierre, PT, DPT  Physical Therapist

## 2023-08-01 ENCOUNTER — OFFICE VISIT (OUTPATIENT)
Dept: BEHAVIORAL HEALTH | Facility: CLINIC | Age: 58
End: 2023-08-01
Payer: COMMERCIAL

## 2023-08-01 DIAGNOSIS — F43.23 ADJUSTMENT DISORDER WITH MIXED ANXIETY AND DEPRESSED MOOD: Primary | ICD-10-CM

## 2023-08-01 DIAGNOSIS — F43.21 GRIEF: ICD-10-CM

## 2023-08-02 ENCOUNTER — TREATMENT (OUTPATIENT)
Dept: PHYSICAL THERAPY | Facility: CLINIC | Age: 58
End: 2023-08-02
Payer: COMMERCIAL

## 2023-08-02 DIAGNOSIS — M77.12 LATERAL EPICONDYLITIS OF LEFT ELBOW: ICD-10-CM

## 2023-08-02 DIAGNOSIS — M25.522 LEFT ELBOW PAIN: Primary | ICD-10-CM

## 2023-08-02 PROCEDURE — 97140 MANUAL THERAPY 1/> REGIONS: CPT | Performed by: PHYSICAL THERAPIST

## 2023-08-02 PROCEDURE — 97110 THERAPEUTIC EXERCISES: CPT | Performed by: PHYSICAL THERAPIST

## 2023-08-02 PROCEDURE — 97112 NEUROMUSCULAR REEDUCATION: CPT | Performed by: PHYSICAL THERAPIST

## 2023-08-09 ENCOUNTER — TREATMENT (OUTPATIENT)
Dept: PHYSICAL THERAPY | Facility: CLINIC | Age: 58
End: 2023-08-09
Payer: COMMERCIAL

## 2023-08-09 DIAGNOSIS — M25.522 LEFT ELBOW PAIN: Primary | ICD-10-CM

## 2023-08-09 DIAGNOSIS — M77.12 LATERAL EPICONDYLITIS OF LEFT ELBOW: ICD-10-CM

## 2023-08-09 PROCEDURE — 97112 NEUROMUSCULAR REEDUCATION: CPT | Performed by: PHYSICAL THERAPIST

## 2023-08-09 PROCEDURE — 97140 MANUAL THERAPY 1/> REGIONS: CPT | Performed by: PHYSICAL THERAPIST

## 2023-08-09 PROCEDURE — 97110 THERAPEUTIC EXERCISES: CPT | Performed by: PHYSICAL THERAPIST

## 2023-08-09 NOTE — PROGRESS NOTES
Physical Therapy Daily Progress Note    Subjective   Lou Parisi reports: 50% improvement since starting PT. She really only feels point tenderness on the lateral epicondyle now.    Today's Pain ratin/10    Objective          Active Range of Motion     Left Wrist   Wrist flexion: 78 degrees   Wrist extension: 76 degrees     Strength/Myotome Testing     Left Wrist/Hand      (2nd hand position)   Left  strength (2nd hand position) 44 lbs    Thumb Strength  Key/Lateral Pinch     Trial 1: 10 lbs  Palmar/Three-Point Pinch     Trial 1: 13 lbs    See Exercise, Manual, and Modality Logs for complete treatment.       Assessment/Plan  Pt has tolerated treatment well. Her AROM and /pinch strength are WNL. She is independent with her HEP. We discussed the importance of her continuing her HEP. She has met all goals that were set for her. At this time, she is safe to be discharged.    Other  DC         Manual Therapy:    24     mins  69102;  Therapeutic Exercise:    15     mins  90590;     Neuromuscular Bonilla:    10    mins  34460;    Therapeutic Activity:          mins  56265;     Gait Training:           mins  02225;     Ultrasound:          mins  51193;    Electrical Stimulation:         mins  54536 ( );  E-Stim Attended:         mins  23607  Iontophoresis          mins 09770   Traction         mins  04935  Fluidotherapy          mins  19597  Dry Needling          mins self-pay - No Charge  Paraffin          mins  12154    Timed Treatment:   49   mins   Total Treatment:     49   mins    Grazyna Pierre, PT, DPT  Physical Therapist

## 2023-08-16 RX ORDER — SODIUM, POTASSIUM,MAG SULFATES 17.5-3.13G
1 SOLUTION, RECONSTITUTED, ORAL ORAL TAKE AS DIRECTED
Qty: 354 ML | Refills: 0 | Status: SHIPPED | OUTPATIENT
Start: 2023-08-16

## 2023-08-23 ENCOUNTER — OUTSIDE FACILITY SERVICE (OUTPATIENT)
Dept: GASTROENTEROLOGY | Facility: CLINIC | Age: 58
End: 2023-08-23
Payer: COMMERCIAL

## 2023-08-23 PROCEDURE — 45385 COLONOSCOPY W/LESION REMOVAL: CPT | Performed by: INTERNAL MEDICINE

## 2023-08-23 PROCEDURE — 45380 COLONOSCOPY AND BIOPSY: CPT | Performed by: INTERNAL MEDICINE

## 2023-08-23 PROCEDURE — 88305 TISSUE EXAM BY PATHOLOGIST: CPT

## 2023-08-24 ENCOUNTER — OFFICE VISIT (OUTPATIENT)
Dept: BEHAVIORAL HEALTH | Facility: CLINIC | Age: 58
End: 2023-08-24
Payer: COMMERCIAL

## 2023-08-24 ENCOUNTER — LAB REQUISITION (OUTPATIENT)
Dept: LAB | Facility: HOSPITAL | Age: 58
End: 2023-08-24
Payer: COMMERCIAL

## 2023-08-24 DIAGNOSIS — K57.30 DIVERTICULOSIS OF LARGE INTESTINE WITHOUT PERFORATION OR ABSCESS WITHOUT BLEEDING: ICD-10-CM

## 2023-08-24 DIAGNOSIS — F43.23 ADJUSTMENT DISORDER WITH MIXED ANXIETY AND DEPRESSED MOOD: Primary | ICD-10-CM

## 2023-08-24 DIAGNOSIS — K63.89 OTHER SPECIFIED DISEASES OF INTESTINE: ICD-10-CM

## 2023-08-24 DIAGNOSIS — K64.8 OTHER HEMORRHOIDS: ICD-10-CM

## 2023-08-24 DIAGNOSIS — D12.2 BENIGN NEOPLASM OF ASCENDING COLON: ICD-10-CM

## 2023-08-24 DIAGNOSIS — Z83.71 FAMILY HISTORY OF COLONIC POLYPS: ICD-10-CM

## 2023-08-24 DIAGNOSIS — F43.21 GRIEF: ICD-10-CM

## 2023-08-24 DIAGNOSIS — Z12.11 ENCOUNTER FOR SCREENING FOR MALIGNANT NEOPLASM OF COLON: ICD-10-CM

## 2023-08-24 NOTE — PROGRESS NOTES
[facility] [place]                 Follow Up Adult      Follow Up Adult Note     Date:2023   Patient Name: Lou Parisi  : 1965   MRN: [id number]   Time IN: 8:12 am    Time OUT: 9:07 am     Referring Provider: Fabiana Matthews MD    Chief Complaint:      ICD-10-CM ICD-9-CM   1. Adjustment disorder with mixed anxiety and depressed mood  F43.23 309.28   2. Grief  F43.21 309.0        History of Present Illness:   Lou Parisi is a 58 y.o. female who is being seen today for follow up counseling for adjustment disorder and grief.    Subjective               Patient's Support Network Includes: extended family    Functional Status: No impairment      Current Outpatient Medications:     buPROPion XL (WELLBUTRIN XL) 150 MG 24 hr tablet, Take 1 tablet by mouth Daily. (Patient not taking: Reported on 2023), Disp: 30 tablet, Rfl: 11    CALCIUM-VITAMIN D PO, Take  by mouth Daily. 1000 units vit D, Disp: , Rfl:     Cholecalciferol (VITAMIN D3) 50 MCG (2000) capsule, Take 1 capsule by mouth Daily., Disp: 30 capsule, Rfl: 11    rosuvastatin (CRESTOR) 10 MG tablet, Take 1 tablet by mouth Daily., Disp: 90 tablet, Rfl: 3    sodium-potassium-magnesium sulfates (Suprep Bowel Prep Kit) 17.5-3.13-1.6 GM/177ML solution oral solution, Take 1 bottle by mouth Take As Directed. Follow instructions that were mailed to your home. If you didn't receive these call [phone]., Disp: 354 mL, Rfl: 0    No Known Allergies    Objective     Physical Exam:  Vital Signs: There were no vitals filed for this visit.  There is no height or weight on file to calculate BMI.     Mental Status Exam:   Hygiene:   good  Cooperation:  Cooperative  Eye Contact:  Good  Psychomotor Behavior:  Appropriate  Affect:  Full range  Mood: normal  Speech:  Normal  Thought Process:  Goal directed  Thought Content:  Normal  Suicidal:  None  Homicidal:  None  Hallucinations:  None  Delusion:   None  Memory:  Intact  Orientation:  Person, Place, Time, and Situation  Reliability:  good  Insight:  Good  Judgement:  Good  Impulse Control:  Good  Physical/Medical Issues:   See problem list      Assessment / Plan      Diagnosis:  Diagnoses and all orders for this visit:    1. Adjustment disorder with mixed anxiety and depressed mood (Primary)    2. Grief    Patient presented for follow-up with clinician.  Patient and clinician identified and addressed patient cognitive distortions related to anxiety.  Patient and clinician processed patient frustration with a coworker.  Patient and clinician reviewed the importance of consistent boundaries between the patient and others.  Patient and clinician processed patient anxiety and worry over their spouses continued use of alcohol.Clinician provided psychoeducation regarding alcoholism and the progression of the disease.  Clinician utilized active listening and reflective response to convey empathy and support.    Progress toward goal: Patient reports incremental progress progress towards the long-term goal of independent management of symptoms of adjustment disorder and grief.  Patient reports taking medication as directed.    Prognosis: Prognosis for patient is good with further outpatient behavioral treatment.    PLAN:  Patient and clinician will continue to utilize a cognitive behavioral intervention which identifies and addresses patient cognitive distortions related to anxiety and depression.  Clinician will utilize shared experience to assist patient in processing grief and also coping with the emotional impact of having a with alcohol use disorder.    Safety: No acute safety concerns  Risk Assessment: Risk of self-harm acutely is low. Risk of self-harm chronically is also low, but could be further elevated in the event of treatment noncompliance and/or AODA.    Treatment Plan/Goals: Continue supportive psychotherapy efforts and medications as indicated.  Treatment and medication options discussed during today's visit. Patient ackowledged and verbally consented to continue with current treatment plan and was educated on the importance of compliance with treatment and follow-up appointments. Patient seems reasonably able to adhere to treatment plan.      Assisted Patient in processing above session content; acknowledged and normalized patient's thoughts, feelings, and concerns.  Rationalized patient thought process regarding anxiety.      Allowed Patient to freely discuss issues  without interruption or judgement with unconditional positive regard, active listening skills, and empathy. Therapist provided a safe, confidential environment to facilitate the development of a positive therapeutic relationship and encouraged open, honest communication. Assisted Patient in identifying risk factors which would indicate the need for higher level of care including thoughts to harm self or others and/or self-harming behavior and encouraged Patient to contact this office, call 911, or present to the nearest emergency room should any of these events occur. Discussed crisis intervention services and means to access. Patient adamantly and convincingly denies current suicidal or homicidal ideation or perceptual disturbance. Assisted Patient in processing session content; acknowledged and normalized Patient's thoughts, feelings, and concerns by utilizing a person-centered approach in efforts to build appropriate rapport and a positive therapeutic relationship with open and honest communication. .     Follow Up:   Return in about 1 month (around 9/24/2023).    Dejon Catalan LCSW

## 2023-08-25 ENCOUNTER — TELEPHONE (OUTPATIENT)
Dept: GASTROENTEROLOGY | Facility: CLINIC | Age: 58
End: 2023-08-25
Payer: COMMERCIAL

## 2023-08-25 LAB — REF LAB TEST METHOD: NORMAL

## 2023-08-25 NOTE — TELEPHONE ENCOUNTER
I spoke to Karime on the phone this afternoon.  She did have 1 adenoma type polyp.  The patient will need a repeat examination in 5 years.

## 2023-09-26 ENCOUNTER — OFFICE VISIT (OUTPATIENT)
Dept: BEHAVIORAL HEALTH | Facility: CLINIC | Age: 58
End: 2023-09-26
Payer: COMMERCIAL

## 2023-09-26 DIAGNOSIS — F43.21 GRIEF: ICD-10-CM

## 2023-09-26 DIAGNOSIS — F43.23 ADJUSTMENT DISORDER WITH MIXED ANXIETY AND DEPRESSED MOOD: Primary | ICD-10-CM
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
## 2023-10-24 ENCOUNTER — OFFICE VISIT (OUTPATIENT)
Dept: BEHAVIORAL HEALTH | Facility: CLINIC | Age: 58
End: 2023-10-24
Payer: COMMERCIAL

## 2023-10-24 DIAGNOSIS — F43.21 GRIEF: ICD-10-CM

## 2023-10-24 DIAGNOSIS — F43.23 ADJUSTMENT DISORDER WITH MIXED ANXIETY AND DEPRESSED MOOD: Primary | ICD-10-CM

## 2023-10-24 NOTE — PROGRESS NOTES
[facility] [place]                 Follow Up Adult      Follow Up Adult Note     Date:10/24/2023   Patient Name: Lou Parisi  : 1965   MRN: [id number]   Time IN: 2:02 pm    Time OUT: 3:03 pm     Referring Provider: Fabiana Matthews MD    Chief Complaint:      ICD-10-CM ICD-9-CM   1. Adjustment disorder with mixed anxiety and depressed mood  F43.23 309.28   2. Grief  F43.21 309.0        History of Present Illness:   Lou Parisi is a 58 y.o. female who is being seen today for follow up counseling for anxiety depression and grief.    Subjective               Patient's Support Network Includes: extended family    Functional Status: No impairment      Current Outpatient Medications:     buPROPion XL (WELLBUTRIN XL) 150 MG 24 hr tablet, Take 1 tablet by mouth Daily. (Patient not taking: Reported on 2023), Disp: 30 tablet, Rfl: 11    CALCIUM-VITAMIN D PO, Take  by mouth Daily. 1000 units vit D, Disp: , Rfl:     Cholecalciferol (VITAMIN D3) 50 MCG (2000) capsule, Take 1 capsule by mouth Daily., Disp: 30 capsule, Rfl: 11    rosuvastatin (CRESTOR) 10 MG tablet, Take 1 tablet by mouth Daily., Disp: 90 tablet, Rfl: 3    sodium-potassium-magnesium sulfates (Suprep Bowel Prep Kit) 17.5-3.13-1.6 GM/177ML solution oral solution, Take 1 bottle by mouth Take As Directed. Follow instructions that were mailed to your home. If you didn't receive these call [phone]., Disp: 354 mL, Rfl: 0    No Known Allergies    Objective     Physical Exam:  Vital Signs: There were no vitals filed for this visit.  There is no height or weight on file to calculate BMI.     Mental Status Exam:   Hygiene:   good  Cooperation:  Cooperative  Eye Contact:  Good  Psychomotor Behavior:  Appropriate  Affect:  Full range  Mood: normal  Speech:  Normal  Thought Process:  Goal directed  Thought Content:  Normal  Suicidal:  None  Homicidal:  None  Hallucinations:  None  Delusion:   None  Memory:  Intact  Orientation:  Person, Place, Time, and Situation  Reliability:  good  Insight:  Good  Judgement:  Good  Impulse Control:  Good  Physical/Medical Issues:   See problem list      Assessment / Plan      Diagnosis:  Diagnoses and all orders for this visit:    1. Adjustment disorder with mixed anxiety and depressed mood (Primary)    2. Grief    Patient presented for follow-up with clinician.  Patient and clinician processed patient frustration with their spouses unchanged behavior regarding alcohol consumption.  Clinician provided psychoeducation regarding the progression of the disease of alcoholism.  Patient was responsive to psychoeducational session.  Patient and clinician discussed patient course of action within the parameters of their own control regarding emotionally appealing to their spouse to increase the frequency of intimate contact and disclosure.  Clinician utilized active listening and reflective response to convey empathy and support.    Progress toward goal: Patient reports little or no progress towards a long-term goal of independent management of symptoms related to anxiety and depression.  Patient reports significant progress in the area of grief reconciliation and processing.    Prognosis: Good with further outpatient behavioral treatment.    PLAN:  Patient and clinician will continue to utilize a cognitive behavioral intervention which identifies and addresses patient cognitive distortions related to anxiety and depression.  Clinician will continue to utilize shared experience to assist patient in reconciling their role as the spouse of an individual with alcohol use disorder.  Follow-ups will occur monthly and last from 45 to 60 minutes in duration.    Safety: No acute safety concerns  Risk Assessment: Risk of self-harm acutely is low. Risk of self-harm chronically is also low, but could be further elevated in the event of treatment noncompliance and/or AODA.    Treatment  Plan/Goals: Continue supportive psychotherapy efforts and medications as indicated. Treatment and medication options discussed during today's visit. Patient ackowledged and verbally consented to continue with current treatment plan and was educated on the importance of compliance with treatment and follow-up appointments. Patient seems reasonably able to adhere to treatment plan.      Assisted Patient in processing above session content; acknowledged and normalized patient’s thoughts, feelings, and concerns.  Rationalized patient thought process regarding anxiety and depression.      Allowed Patient to freely discuss issues  without interruption or judgement with unconditional positive regard, active listening skills, and empathy. Therapist provided a safe, confidential environment to facilitate the development of a positive therapeutic relationship and encouraged open, honest communication. Assisted Patient in identifying risk factors which would indicate the need for higher level of care including thoughts to harm self or others and/or self-harming behavior and encouraged Patient to contact this office, call 911, or present to the nearest emergency room should any of these events occur. Discussed crisis intervention services and means to access. Patient adamantly and convincingly denies current suicidal or homicidal ideation or perceptual disturbance. Assisted Patient in processing session content; acknowledged and normalized Patient’s thoughts, feelings, and concerns by utilizing a person-centered approach in efforts to build appropriate rapport and a positive therapeutic relationship with open and honest communication. .     Part of this note may be an electronic transcription/translation of spoken language to printed text using the Dragon Dictation System.       Follow Up:   Return in about 1 month (around 11/24/2023).    Dejon Catalan LCSW

## 2024-04-12 DIAGNOSIS — E55.9 VITAMIN D DEFICIENCY: Chronic | ICD-10-CM

## 2024-04-12 DIAGNOSIS — R73.01 IMPAIRED FASTING GLUCOSE: Chronic | ICD-10-CM

## 2024-04-12 DIAGNOSIS — Z78.0 MENOPAUSE: Primary | Chronic | ICD-10-CM

## 2024-04-12 DIAGNOSIS — Z00.00 PE (PHYSICAL EXAM), ROUTINE: Chronic | ICD-10-CM

## 2024-04-12 DIAGNOSIS — E78.2 MIXED HYPERLIPIDEMIA: Chronic | ICD-10-CM
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
## 2024-06-03 NOTE — PROGRESS NOTES
"Chief Complaint   Patient presents with    Annual Exam    Hyperlipidemia    Impaired fasting glucose    Vitamin D Deficiency       History of Present Illness  59 y.o.  female presents for updated phys examination and f/u on cholesterol and sugars. Feels well overall.    Oldest is getting  in [place] in 4 mos. Daughter in [place] moved home for 3 mos (after 5yr relationship) and is now living in [place]. Youngest son has moved home from [place]. Everyone is doing well.    Reports nl Pap from last year; GYN at .    Review of Systems  Denies headaches, visual changes, CP, palpitations, SOB, cough, abd pain, n/v/d, difficulty with urination, numbness/tingling, falls, mood changes, lightheadedness, hearing changes, rashes.  Denies vaginal discharge or bleeding (no periods) or breast concerns.   All other ROS reviewed and negative.    Current Outpatient Medications:     CALCIUM-VITAMIN D QD    Cholecalciferol (VITAMIN D3) 50 MCG (2000 UT) QD    rosuvastatin 10 MG QD    VITALS:  /78   Pulse 64   Temp 98.2 °F (36.8 °C)   Ht 170.8 cm (67.25\")   Wt 67.8 kg (149 lb 6.4 oz)   SpO2 98%   BMI 23.23 kg/m²     Physical Exam  Vitals and nursing note reviewed.   Constitutional:       General: She is not in acute distress.     Appearance: Normal appearance. She is well-developed.   HENT:      Head: Normocephalic.      Right Ear: Tympanic membrane, ear canal and external ear normal.      Left Ear: Tympanic membrane, ear canal and external ear normal.      Nose: Nose normal.   Eyes:      Extraocular Movements: Extraocular movements intact.      Conjunctiva/sclera: Conjunctivae normal.      Pupils: Pupils are equal, round, and reactive to light.      Comments: Wearing glasses   Neck:      Vascular: No carotid bruit (bilaterally).   Cardiovascular:      Rate and Rhythm: Normal rate and regular rhythm.      Heart sounds: Normal heart sounds.   Pulmonary:      Effort: Pulmonary effort is normal. No respiratory " distress.      Breath sounds: Normal breath sounds. No wheezing, rhonchi or rales.   Abdominal:      General: Bowel sounds are normal. There is no distension.      Palpations: Abdomen is soft. There is no mass.      Tenderness: There is no abdominal tenderness.   Genitourinary:     Comments: Chaperone declined by patient.    Breast exam unremarkable without masses, skin changes, nipple discharge, or axillary adenopathy.        Musculoskeletal:      Cervical back: Normal range of motion and neck supple.      Right lower leg: No edema.      Left lower leg: No edema.   Lymphadenopathy:      Cervical: No cervical adenopathy.   Skin:     General: Skin is warm and dry.      Findings: No rash.   Neurological:      Mental Status: She is alert and oriented to person, place, and time.      Gait: Gait normal.   Psychiatric:         Mood and Affect: Mood normal.         Behavior: Behavior normal.         LABS  Results for orders placed or performed in visit on 06/04/24   Comprehensive Metabolic Panel    Specimen: Blood   Result Value Ref Range    Glucose 96 65 - 99 mg/dL    BUN 15 8 - 23 mg/dL    Creatinine 0.74 0.57 - 1.00 mg/dL    Sodium 139 136 - 145 mmol/L    Potassium 3.9 3.5 - 5.2 mmol/L    Chloride 106 98 - 107 mmol/L    CO2 24.2 22.0 - 29.0 mmol/L    Calcium 9.1 8.6 - 10.5 mg/dL    Total Protein 6.3 6.0 - 8.5 g/dL    Albumin 4.0 3.5 - 5.2 g/dL    ALT (SGPT) 21 1 - 33 U/L    AST (SGOT) 17 1 - 32 U/L    Alkaline Phosphatase 48 39 - 117 U/L    Total Bilirubin 1.0 0.0 - 1.2 mg/dL    Globulin 2.3 gm/dL    A/G Ratio 1.7 g/dL    BUN/Creatinine Ratio 20.3 7.0 - 25.0    Anion Gap 8.8 5.0 - 15.0 mmol/L    eGFR 92.8 >60.0 mL/min/1.73   Lipid Panel    Specimen: Blood   Result Value Ref Range    Total Cholesterol 125 0 - 200 mg/dL    Triglycerides 107 0 - 150 mg/dL    HDL Cholesterol 44 40 - 60 mg/dL    LDL Cholesterol  61 0 - 100 mg/dL    VLDL Cholesterol 20 5 - 40 mg/dL    LDL/HDL Ratio 1.35    TSH    Specimen: Blood   Result  Value Ref Range    TSH 1.800 0.270 - 4.200 uIU/mL   Hemoglobin A1c    Specimen: Blood   Result Value Ref Range    Hemoglobin A1C 5.30 4.80 - 5.60 %   Vitamin D,25-Hydroxy    Specimen: Blood   Result Value Ref Range    25 Hydroxy, Vitamin D 34.6 30.0 - 100.0 ng/ml   CBC Auto Differential    Specimen: Blood   Result Value Ref Range    WBC 5.59 3.40 - 10.80 10*3/mm3    RBC 4.10 3.77 - 5.28 10*6/mm3    Hemoglobin 13.4 12.0 - 15.9 g/dL    Hematocrit 38.7 34.0 - 46.6 %    MCV 94.4 79.0 - 97.0 fL    MCH 32.7 26.6 - 33.0 pg    MCHC 34.6 31.5 - 35.7 g/dL    RDW 12.6 12.3 - 15.4 %    RDW-SD 43.3 37.0 - 54.0 fl    MPV 10.5 6.0 - 12.0 fL    Platelets 210 140 - 450 10*3/mm3    Neutrophil % 42.4 (L) 42.7 - 76.0 %    Lymphocyte % 42.9 19.6 - 45.3 %    Monocyte % 9.1 5.0 - 12.0 %    Eosinophil % 4.1 0.3 - 6.2 %    Basophil % 1.3 0.0 - 1.5 %    Immature Grans % 0.2 0.0 - 0.5 %    Neutrophils, Absolute 2.37 1.70 - 7.00 10*3/mm3    Lymphocytes, Absolute 2.40 0.70 - 3.10 10*3/mm3    Monocytes, Absolute 0.51 0.10 - 0.90 10*3/mm3    Eosinophils, Absolute 0.23 0.00 - 0.40 10*3/mm3    Basophils, Absolute 0.07 0.00 - 0.20 10*3/mm3    Immature Grans, Absolute 0.01 0.00 - 0.05 10*3/mm3    nRBC 0.0 0.0 - 0.2 /100 WBC   Urinalysis without microscopic (no culture) - Urine, Clean Catch    Specimen: Urine, Clean Catch   Result Value Ref Range    Color, UA Yellow Yellow, Straw    Appearance, UA Clear Clear    pH, UA 6.0 5.0 - 8.0    Specific Gravity, UA 1.024 1.005 - 1.030    Glucose, UA Negative Negative    Ketones, UA Negative Negative    Bilirubin, UA Negative Negative    Blood, UA Negative Negative    Protein, UA Negative Negative    Leuk Esterase, UA Small (1+) (A) Negative    Nitrite, UA Negative Negative    Urobilinogen, UA 1.0 E.U./dL 0.2 - 1.0 E.U./dL   Urinalysis, Microscopic Only - Urine, Clean Catch    Specimen: Urine, Clean Catch   Result Value Ref Range    RBC, UA 0-2 None Seen, 0-2 /HPF    WBC, UA 0-2 None Seen, 0-2 /HPF    Bacteria,  UA None Seen None Seen /HPF    Squamous Epithelial Cells, UA 7-12 (A) None Seen, 0-2 /HPF    Hyaline Casts, UA 0-2 None Seen /LPF    Methodology Automated Microscopy      5/30/23 A1C 5.2      ECG 12 Lead    Date/Time: 6/6/2024 10:08 AM  Performed by: Fabiana Matthews MD    Authorized by: Fabiana Matthews MD  Comparison: compared with previous ECG from 6/1/2023  Similar to previous ECG  Rhythm: sinus rhythm  Rate: normal  BPM: 59  Conduction: incomplete right bundle branch block  ST Segments: ST segments normal  T Waves: T waves normal  QRS axis: left  Clinical impression comment: stable EKG            ASSESSMENT/PLAN    Diagnoses and all orders for this visit:    1. PE (physical exam), routine (Primary)  Assessment & Plan:  Health maintenance - rec COVID19 vacc (no further per pt) and RSV vacc (get at pharmacy), counseling given; rec flu vacc in the fall; Td 5/21 (next Tdap due 2031) - reviewed current pertussis outbreak in [facility]; HAV done; rec Shingrix, counseling given; mammo 7/21/23 (); GYN/Pap per  Li Gerber, APRN 6/20/23; DEXA 9/22, repeat 2025; colonoscopy 8/23/23, repeat 2028 per Dr. Cameron; eye exam Qyr (contacts); dental q6 mos; (+) seat belt use      2. Hyperlipidemia  Assessment & Plan:   Lipids at goal with LDL 61; cont rosuvastatin 10mg QD #90, 3RF; encouraged increased aerobic exercise to improve HDL, currently 44 with goal > 50    Orders:  -     ECG 12 Lead  -     rosuvastatin (CRESTOR) 10 MG tablet; Take 1 tablet by mouth Daily.  Dispense: 90 tablet; Refill: 3    3. Impaired fasting glucose  Assessment & Plan:  BG control stable with A1C 5.3; encouraged reg phys activity to decr insulin resistance, moderation in unhealthy starches/sweets; f/u A1C in 12 mos      4. Vitamin D deficiency  Assessment & Plan:  Stable vit D level 34.6; cont 2000 units QD supplementation      5. Osteopenia  Assessment & Plan:  Calcium and vitamin D supplementation with weight-bearing exercise; DEXA 9/22,  repeat 2025             FOLLOW-UP  RTC 1yr for annual PE; fasting labs the week prior to appt (CBC, CMP, TSH, lipids, UA/micro, vit D, A1C)      Electronically signed by:    Fabiana Matthews MD, FACP  06/06/2024

## 2024-06-04 ENCOUNTER — LAB (OUTPATIENT)
Dept: LAB | Facility: HOSPITAL | Age: 59
End: 2024-06-04
Payer: COMMERCIAL

## 2024-06-04 DIAGNOSIS — R73.01 IMPAIRED FASTING GLUCOSE: Chronic | ICD-10-CM

## 2024-06-04 DIAGNOSIS — E55.9 VITAMIN D DEFICIENCY: Chronic | ICD-10-CM

## 2024-06-04 DIAGNOSIS — E78.2 MIXED HYPERLIPIDEMIA: Chronic | ICD-10-CM

## 2024-06-04 DIAGNOSIS — Z00.00 PE (PHYSICAL EXAM), ROUTINE: ICD-10-CM

## 2024-06-04 LAB
25(OH)D3 SERPL-MCNC: 34.6 NG/ML (ref 30–100)
ALBUMIN SERPL-MCNC: 4 G/DL (ref 3.5–5.2)
ALBUMIN/GLOB SERPL: 1.7 G/DL
ALP SERPL-CCNC: 48 U/L (ref 39–117)
ALT SERPL W P-5'-P-CCNC: 21 U/L (ref 1–33)
ANION GAP SERPL CALCULATED.3IONS-SCNC: 8.8 MMOL/L (ref 5–15)
AST SERPL-CCNC: 17 U/L (ref 1–32)
BACTERIA UR QL AUTO: ABNORMAL /HPF
BASOPHILS # BLD AUTO: 0.07 10*3/MM3 (ref 0–0.2)
BASOPHILS NFR BLD AUTO: 1.3 % (ref 0–1.5)
BILIRUB SERPL-MCNC: 1 MG/DL (ref 0–1.2)
BILIRUB UR QL STRIP: NEGATIVE
BUN SERPL-MCNC: 15 MG/DL (ref 8–23)
BUN/CREAT SERPL: 20.3 (ref 7–25)
CALCIUM SPEC-SCNC: 9.1 MG/DL (ref 8.6–10.5)
CHLORIDE SERPL-SCNC: 106 MMOL/L (ref 98–107)
CHOLEST SERPL-MCNC: 125 MG/DL (ref 0–200)
CLARITY UR: CLEAR
CO2 SERPL-SCNC: 24.2 MMOL/L (ref 22–29)
COLOR UR: YELLOW
CREAT SERPL-MCNC: 0.74 MG/DL (ref 0.57–1)
DEPRECATED RDW RBC AUTO: 43.3 FL (ref 37–54)
EGFRCR SERPLBLD CKD-EPI 2021: 92.8 ML/MIN/1.73
EOSINOPHIL # BLD AUTO: 0.23 10*3/MM3 (ref 0–0.4)
EOSINOPHIL NFR BLD AUTO: 4.1 % (ref 0.3–6.2)
ERYTHROCYTE [DISTWIDTH] IN BLOOD BY AUTOMATED COUNT: 12.6 % (ref 12.3–15.4)
GLOBULIN UR ELPH-MCNC: 2.3 GM/DL
GLUCOSE SERPL-MCNC: 96 MG/DL (ref 65–99)
GLUCOSE UR STRIP-MCNC: NEGATIVE MG/DL
HBA1C MFR BLD: 5.3 % (ref 4.8–5.6)
HCT VFR BLD AUTO: 38.7 % (ref 34–46.6)
HDLC SERPL-MCNC: 44 MG/DL (ref 40–60)
HGB BLD-MCNC: 13.4 G/DL (ref 12–15.9)
HGB UR QL STRIP.AUTO: NEGATIVE
HYALINE CASTS UR QL AUTO: ABNORMAL /LPF
IMM GRANULOCYTES # BLD AUTO: 0.01 10*3/MM3 (ref 0–0.05)
IMM GRANULOCYTES NFR BLD AUTO: 0.2 % (ref 0–0.5)
KETONES UR QL STRIP: NEGATIVE
LDLC SERPL CALC-MCNC: 61 MG/DL (ref 0–100)
LDLC/HDLC SERPL: 1.35 {RATIO}
LEUKOCYTE ESTERASE UR QL STRIP.AUTO: ABNORMAL
LYMPHOCYTES # BLD AUTO: 2.4 10*3/MM3 (ref 0.7–3.1)
LYMPHOCYTES NFR BLD AUTO: 42.9 % (ref 19.6–45.3)
MCH RBC QN AUTO: 32.7 PG (ref 26.6–33)
MCHC RBC AUTO-ENTMCNC: 34.6 G/DL (ref 31.5–35.7)
MCV RBC AUTO: 94.4 FL (ref 79–97)
MONOCYTES # BLD AUTO: 0.51 10*3/MM3 (ref 0.1–0.9)
MONOCYTES NFR BLD AUTO: 9.1 % (ref 5–12)
NEUTROPHILS NFR BLD AUTO: 2.37 10*3/MM3 (ref 1.7–7)
NEUTROPHILS NFR BLD AUTO: 42.4 % (ref 42.7–76)
NITRITE UR QL STRIP: NEGATIVE
NRBC BLD AUTO-RTO: 0 /100 WBC (ref 0–0.2)
PH UR STRIP.AUTO: 6 [PH] (ref 5–8)
PLATELET # BLD AUTO: 210 10*3/MM3 (ref 140–450)
PMV BLD AUTO: 10.5 FL (ref 6–12)
POTASSIUM SERPL-SCNC: 3.9 MMOL/L (ref 3.5–5.2)
PROT SERPL-MCNC: 6.3 G/DL (ref 6–8.5)
PROT UR QL STRIP: NEGATIVE
RBC # BLD AUTO: 4.1 10*6/MM3 (ref 3.77–5.28)
RBC # UR STRIP: ABNORMAL /HPF
REF LAB TEST METHOD: ABNORMAL
SODIUM SERPL-SCNC: 139 MMOL/L (ref 136–145)
SP GR UR STRIP: 1.02 (ref 1–1.03)
SQUAMOUS #/AREA URNS HPF: ABNORMAL /HPF
TRIGL SERPL-MCNC: 107 MG/DL (ref 0–150)
TSH SERPL DL<=0.05 MIU/L-ACNC: 1.8 UIU/ML (ref 0.27–4.2)
UROBILINOGEN UR QL STRIP: ABNORMAL
VLDLC SERPL-MCNC: 20 MG/DL (ref 5–40)
WBC # UR STRIP: ABNORMAL /HPF
WBC NRBC COR # BLD AUTO: 5.59 10*3/MM3 (ref 3.4–10.8)

## 2024-06-04 PROCEDURE — 82306 VITAMIN D 25 HYDROXY: CPT

## 2024-06-04 PROCEDURE — 80050 GENERAL HEALTH PANEL: CPT

## 2024-06-04 PROCEDURE — 83036 HEMOGLOBIN GLYCOSYLATED A1C: CPT

## 2024-06-04 PROCEDURE — 81001 URINALYSIS AUTO W/SCOPE: CPT

## 2024-06-04 PROCEDURE — 80061 LIPID PANEL: CPT

## 2024-06-05 NOTE — ASSESSMENT & PLAN NOTE
Lipids at goal with LDL 61; cont rosuvastatin 10mg QD #90, 3RF; encouraged increased aerobic exercise to improve HDL, currently 44 with goal > 50

## 2024-06-05 NOTE — ASSESSMENT & PLAN NOTE
BG control stable with A1C 5.3; encouraged reg phys activity to decr insulin resistance, moderation in unhealthy starches/sweets; f/u A1C in 12 mos

## 2024-06-06 ENCOUNTER — OFFICE VISIT (OUTPATIENT)
Dept: INTERNAL MEDICINE | Facility: CLINIC | Age: 59
End: 2024-06-06
Payer: COMMERCIAL

## 2024-06-06 VITALS
BODY MASS INDEX: 23.45 KG/M2 | HEIGHT: 67 IN | DIASTOLIC BLOOD PRESSURE: 78 MMHG | WEIGHT: 149.4 LBS | TEMPERATURE: 98.2 F | HEART RATE: 64 BPM | SYSTOLIC BLOOD PRESSURE: 108 MMHG | OXYGEN SATURATION: 98 %

## 2024-06-06 DIAGNOSIS — E78.2 MIXED HYPERLIPIDEMIA: Chronic | ICD-10-CM

## 2024-06-06 DIAGNOSIS — M85.89 OSTEOPENIA OF MULTIPLE SITES: Chronic | ICD-10-CM

## 2024-06-06 DIAGNOSIS — E55.9 VITAMIN D DEFICIENCY: Chronic | ICD-10-CM

## 2024-06-06 DIAGNOSIS — Z00.00 PE (PHYSICAL EXAM), ROUTINE: Primary | ICD-10-CM

## 2024-06-06 DIAGNOSIS — R73.01 IMPAIRED FASTING GLUCOSE: Chronic | ICD-10-CM

## 2024-06-06 PROCEDURE — 99396 PREV VISIT EST AGE 40-64: CPT | Performed by: INTERNAL MEDICINE

## 2024-06-06 PROCEDURE — 93000 ELECTROCARDIOGRAM COMPLETE: CPT | Performed by: INTERNAL MEDICINE

## 2024-06-06 RX ORDER — ROSUVASTATIN CALCIUM 10 MG/1
10 TABLET, COATED ORAL DAILY
Qty: 90 TABLET | Refills: 3 | Status: SHIPPED | OUTPATIENT
Start: 2024-06-06

## 2024-07-31 ENCOUNTER — TELEPHONE (OUTPATIENT)
Dept: INTERNAL MEDICINE | Facility: CLINIC | Age: 59
End: 2024-07-31
Payer: COMMERCIAL

## 2024-07-31 DIAGNOSIS — R92.8 ABNORMAL MAMMOGRAM: Primary | ICD-10-CM

## 2024-07-31 NOTE — TELEPHONE ENCOUNTER
[facility] MAMMO NEEDS A REQUEST OF ADDITIONAL IMAGES DUE TO ABNORMAL FINDINGS.    LEFT BREAST DIAGNOSTIC MAMMOGRAM AND LEFT BREAST ULTRASOUND.      MAMADOU @ : [phone]

## 2024-07-31 NOTE — TELEPHONE ENCOUNTER
I will order this time but pls encourage patient to get future mammograms at [facility]. Any follow-up imaging needed would have been automatically ordered, thus not delaying care.

## 2024-08-13 ENCOUNTER — TELEPHONE (OUTPATIENT)
Dept: INTERNAL MEDICINE | Facility: CLINIC | Age: 59
End: 2024-08-13
Payer: COMMERCIAL

## 2024-08-13 DIAGNOSIS — R92.8 ABNORMAL MAMMOGRAM: Primary | ICD-10-CM

## 2024-08-13 NOTE — TELEPHONE ENCOUNTER
[facility] CALLED AND NEEDS AN ORDER FOR A BREAST ULTRASOUND FAXED TO THEM -[phone]. PLEASE CALL THEM -[phone] IF THERE ARE ANY QUESTIONS

## 2024-08-29 ENCOUNTER — TELEPHONE (OUTPATIENT)
Dept: INTERNAL MEDICINE | Facility: CLINIC | Age: 59
End: 2024-08-29

## 2025-05-12 DIAGNOSIS — E78.2 MIXED HYPERLIPIDEMIA: ICD-10-CM

## 2025-05-12 DIAGNOSIS — R73.01 IMPAIRED FASTING GLUCOSE: ICD-10-CM

## 2025-05-12 DIAGNOSIS — E55.9 VITAMIN D DEFICIENCY: ICD-10-CM

## 2025-05-12 DIAGNOSIS — Z00.00 PE (PHYSICAL EXAM), ROUTINE: Primary | ICD-10-CM

## 2025-06-09 ENCOUNTER — LAB (OUTPATIENT)
Dept: LAB | Facility: HOSPITAL | Age: 60
End: 2025-06-09
Payer: COMMERCIAL

## 2025-06-09 DIAGNOSIS — E55.9 VITAMIN D DEFICIENCY: ICD-10-CM

## 2025-06-09 DIAGNOSIS — E78.2 MIXED HYPERLIPIDEMIA: ICD-10-CM

## 2025-06-09 DIAGNOSIS — Z00.00 PE (PHYSICAL EXAM), ROUTINE: ICD-10-CM

## 2025-06-09 DIAGNOSIS — R73.01 IMPAIRED FASTING GLUCOSE: ICD-10-CM

## 2025-06-09 LAB
25(OH)D3 SERPL-MCNC: 39.1 NG/ML (ref 30–100)
ALBUMIN SERPL-MCNC: 4.3 G/DL (ref 3.5–5.2)
ALBUMIN/GLOB SERPL: 2.2 G/DL
ALP SERPL-CCNC: 52 U/L (ref 39–117)
ALT SERPL W P-5'-P-CCNC: 23 U/L (ref 1–33)
ANION GAP SERPL CALCULATED.3IONS-SCNC: 10.2 MMOL/L (ref 5–15)
AST SERPL-CCNC: 22 U/L (ref 1–32)
BACTERIA UR QL AUTO: NORMAL /HPF
BASOPHILS # BLD AUTO: 0.07 10*3/MM3 (ref 0–0.2)
BASOPHILS NFR BLD AUTO: 1.2 % (ref 0–1.5)
BILIRUB SERPL-MCNC: 0.8 MG/DL (ref 0–1.2)
BILIRUB UR QL STRIP: NEGATIVE
BUN SERPL-MCNC: 15 MG/DL (ref 8–23)
BUN/CREAT SERPL: 19 (ref 7–25)
CALCIUM SPEC-SCNC: 9.3 MG/DL (ref 8.6–10.5)
CHLORIDE SERPL-SCNC: 107 MMOL/L (ref 98–107)
CHOLEST SERPL-MCNC: 119 MG/DL (ref 0–200)
CLARITY UR: CLEAR
CO2 SERPL-SCNC: 23.8 MMOL/L (ref 22–29)
COLOR UR: YELLOW
CREAT SERPL-MCNC: 0.79 MG/DL (ref 0.57–1)
DEPRECATED RDW RBC AUTO: 45.9 FL (ref 37–54)
EGFRCR SERPLBLD CKD-EPI 2021: 85.2 ML/MIN/1.73
EOSINOPHIL # BLD AUTO: 0.18 10*3/MM3 (ref 0–0.4)
EOSINOPHIL NFR BLD AUTO: 3 % (ref 0.3–6.2)
ERYTHROCYTE [DISTWIDTH] IN BLOOD BY AUTOMATED COUNT: 12.7 % (ref 12.3–15.4)
GLOBULIN UR ELPH-MCNC: 2 GM/DL
GLUCOSE SERPL-MCNC: 96 MG/DL (ref 65–99)
GLUCOSE UR STRIP-MCNC: NEGATIVE MG/DL
HBA1C MFR BLD: 5.3 % (ref 4.8–5.6)
HCT VFR BLD AUTO: 41.7 % (ref 34–46.6)
HDLC SERPL-MCNC: 40 MG/DL (ref 40–60)
HGB BLD-MCNC: 13.6 G/DL (ref 12–15.9)
HGB UR QL STRIP.AUTO: NEGATIVE
HYALINE CASTS UR QL AUTO: NORMAL /LPF
IMM GRANULOCYTES # BLD AUTO: 0.01 10*3/MM3 (ref 0–0.05)
IMM GRANULOCYTES NFR BLD AUTO: 0.2 % (ref 0–0.5)
KETONES UR QL STRIP: NEGATIVE
LDLC SERPL CALC-MCNC: 64 MG/DL (ref 0–100)
LDLC/HDLC SERPL: 1.61 {RATIO}
LEUKOCYTE ESTERASE UR QL STRIP.AUTO: ABNORMAL
LYMPHOCYTES # BLD AUTO: 2.58 10*3/MM3 (ref 0.7–3.1)
LYMPHOCYTES NFR BLD AUTO: 43.7 % (ref 19.6–45.3)
MCH RBC QN AUTO: 32.1 PG (ref 26.6–33)
MCHC RBC AUTO-ENTMCNC: 32.6 G/DL (ref 31.5–35.7)
MCV RBC AUTO: 98.3 FL (ref 79–97)
MONOCYTES # BLD AUTO: 0.58 10*3/MM3 (ref 0.1–0.9)
MONOCYTES NFR BLD AUTO: 9.8 % (ref 5–12)
NEUTROPHILS NFR BLD AUTO: 2.49 10*3/MM3 (ref 1.7–7)
NEUTROPHILS NFR BLD AUTO: 42.1 % (ref 42.7–76)
NITRITE UR QL STRIP: NEGATIVE
NRBC BLD AUTO-RTO: 0 /100 WBC (ref 0–0.2)
PH UR STRIP.AUTO: 6 [PH] (ref 5–8)
PLATELET # BLD AUTO: 202 10*3/MM3 (ref 140–450)
PMV BLD AUTO: 10.7 FL (ref 6–12)
POTASSIUM SERPL-SCNC: 4.4 MMOL/L (ref 3.5–5.2)
PROT SERPL-MCNC: 6.3 G/DL (ref 6–8.5)
PROT UR QL STRIP: NEGATIVE
RBC # BLD AUTO: 4.24 10*6/MM3 (ref 3.77–5.28)
RBC # UR STRIP: NORMAL /HPF
REF LAB TEST METHOD: NORMAL
SODIUM SERPL-SCNC: 141 MMOL/L (ref 136–145)
SP GR UR STRIP: 1.01 (ref 1–1.03)
SQUAMOUS #/AREA URNS HPF: NORMAL /HPF
TRIGL SERPL-MCNC: 74 MG/DL (ref 0–150)
TSH SERPL DL<=0.05 MIU/L-ACNC: 1.43 UIU/ML (ref 0.27–4.2)
UROBILINOGEN UR QL STRIP: ABNORMAL
VLDLC SERPL-MCNC: 15 MG/DL (ref 5–40)
WBC # UR STRIP: NORMAL /HPF
WBC NRBC COR # BLD AUTO: 5.91 10*3/MM3 (ref 3.4–10.8)

## 2025-06-09 PROCEDURE — 36415 COLL VENOUS BLD VENIPUNCTURE: CPT

## 2025-06-09 PROCEDURE — 80050 GENERAL HEALTH PANEL: CPT

## 2025-06-09 PROCEDURE — 80061 LIPID PANEL: CPT

## 2025-06-09 PROCEDURE — 82306 VITAMIN D 25 HYDROXY: CPT

## 2025-06-09 PROCEDURE — 83036 HEMOGLOBIN GLYCOSYLATED A1C: CPT

## 2025-06-09 PROCEDURE — 81001 URINALYSIS AUTO W/SCOPE: CPT

## 2025-06-09 NOTE — PROGRESS NOTES
"Chief Complaint   Patient presents with    Annual Exam    Hyperlipidemia    Vitamin D Deficiency    Impaired fasting glucose       History of Present Illness  60 y.o.  female presents for updated phys examination and f/u on cholesterol and sugars.    Father passed away at age 94 in April. Went to hospital with cough, ended up passing with pneumonia; previously with heart issues.    Wondering about genetic testing for breast CA with (+) FH in MGM and 2 mat aunts. Mother did not have breast CA.     Review of Systems  Denies headaches, visual changes, CP, palpitations, SOB, cough, abd pain, n/v/d, difficulty with urination, numbness/tingling, falls, mood changes, lightheadedness, hearing changes, rashes.   All other ROS reviewed and negative.    Current Outpatient Medications:     CALCIUM-VITAMIN D QD    Cholecalciferol (VITAMIN D3) 50 MCG (2000 UT) QD    rosuvastatin 10 MG QD    VITALS:  /70   Pulse 65   Ht 170.8 cm (67.25\")   Wt 67.9 kg (149 lb 9.6 oz)   SpO2 97%   BMI 23.26 kg/m²     Physical Exam  Vitals and nursing note reviewed.   Constitutional:       General: She is not in acute distress.     Appearance: Normal appearance. She is well-developed.   HENT:      Head: Normocephalic.      Right Ear: Ear canal and external ear normal.      Left Ear: Ear canal and external ear normal.      Nose: Nose normal.   Eyes:      Extraocular Movements: Extraocular movements intact.      Conjunctiva/sclera: Conjunctivae normal.      Pupils: Pupils are equal, round, and reactive to light.   Neck:      Vascular: No carotid bruit (bilaterally).   Cardiovascular:      Rate and Rhythm: Normal rate and regular rhythm.      Heart sounds: Normal heart sounds.   Pulmonary:      Effort: Pulmonary effort is normal. No respiratory distress.      Breath sounds: Normal breath sounds.   Abdominal:      General: Bowel sounds are normal. There is no distension.      Palpations: Abdomen is soft. There is no mass.      " Tenderness: There is no abdominal tenderness.   Genitourinary:     Comments: Breast/pelvic exams deferred to GYN    Musculoskeletal:      Cervical back: Normal range of motion and neck supple.   Lymphadenopathy:      Cervical: No cervical adenopathy.   Skin:     General: Skin is warm and dry.      Findings: No rash.   Neurological:      Mental Status: She is alert.   Psychiatric:         Mood and Affect: Mood normal.         Behavior: Behavior normal.         LABS  Results for orders placed or performed in visit on 06/09/25   Comprehensive metabolic panel    Collection Time: 06/09/25  8:53 AM    Specimen: Blood   Result Value Ref Range    Glucose 96 65 - 99 mg/dL    BUN 15.0 8.0 - 23.0 mg/dL    Creatinine 0.79 0.57 - 1.00 mg/dL    Sodium 141 136 - 145 mmol/L    Potassium 4.4 3.5 - 5.2 mmol/L    Chloride 107 98 - 107 mmol/L    CO2 23.8 22.0 - 29.0 mmol/L    Calcium 9.3 8.6 - 10.5 mg/dL    Total Protein 6.3 6.0 - 8.5 g/dL    Albumin 4.3 3.5 - 5.2 g/dL    ALT (SGPT) 23 1 - 33 U/L    AST (SGOT) 22 1 - 32 U/L    Alkaline Phosphatase 52 39 - 117 U/L    Total Bilirubin 0.8 0.0 - 1.2 mg/dL    Globulin 2.0 gm/dL    A/G Ratio 2.2 g/dL    BUN/Creatinine Ratio 19.0 7.0 - 25.0    Anion Gap 10.2 5.0 - 15.0 mmol/L    eGFR 85.2 >60.0 mL/min/1.73   Hemoglobin A1c    Collection Time: 06/09/25  8:53 AM    Specimen: Blood   Result Value Ref Range    Hemoglobin A1C 5.30 4.80 - 5.60 %   TSH    Collection Time: 06/09/25  8:53 AM    Specimen: Blood   Result Value Ref Range    TSH 1.430 0.270 - 4.200 uIU/mL   Lipid panel    Collection Time: 06/09/25  8:53 AM    Specimen: Blood   Result Value Ref Range    Total Cholesterol 119 0 - 200 mg/dL    Triglycerides 74 0 - 150 mg/dL    HDL Cholesterol 40 40 - 60 mg/dL    LDL Cholesterol  64 0 - 100 mg/dL    VLDL Cholesterol 15 5 - 40 mg/dL    LDL/HDL Ratio 1.61    Vitamin D 25 hydroxy    Collection Time: 06/09/25  8:53 AM    Specimen: Blood   Result Value Ref Range    25 Hydroxy, Vitamin D 39.1 30.0  - 100.0 ng/ml   CBC Auto Differential    Collection Time: 06/09/25  8:53 AM    Specimen: Blood   Result Value Ref Range    WBC 5.91 3.40 - 10.80 10*3/mm3    RBC 4.24 3.77 - 5.28 10*6/mm3    Hemoglobin 13.6 12.0 - 15.9 g/dL    Hematocrit 41.7 34.0 - 46.6 %    MCV 98.3 (H) 79.0 - 97.0 fL    MCH 32.1 26.6 - 33.0 pg    MCHC 32.6 31.5 - 35.7 g/dL    RDW 12.7 12.3 - 15.4 %    RDW-SD 45.9 37.0 - 54.0 fl    MPV 10.7 6.0 - 12.0 fL    Platelets 202 140 - 450 10*3/mm3    Neutrophil % 42.1 (L) 42.7 - 76.0 %    Lymphocyte % 43.7 19.6 - 45.3 %    Monocyte % 9.8 5.0 - 12.0 %    Eosinophil % 3.0 0.3 - 6.2 %    Basophil % 1.2 0.0 - 1.5 %    Immature Grans % 0.2 0.0 - 0.5 %    Neutrophils, Absolute 2.49 1.70 - 7.00 10*3/mm3    Lymphocytes, Absolute 2.58 0.70 - 3.10 10*3/mm3    Monocytes, Absolute 0.58 0.10 - 0.90 10*3/mm3    Eosinophils, Absolute 0.18 0.00 - 0.40 10*3/mm3    Basophils, Absolute 0.07 0.00 - 0.20 10*3/mm3    Immature Grans, Absolute 0.01 0.00 - 0.05 10*3/mm3    nRBC 0.0 0.0 - 0.2 /100 WBC   Urinalysis without microscopic (no culture) - Urine, Clean Catch    Collection Time: 06/09/25  8:53 AM    Specimen: Urine, Clean Catch   Result Value Ref Range    Color, UA Yellow Yellow, Straw    Appearance, UA Clear Clear    pH, UA 6.0 5.0 - 8.0    Specific Gravity, UA 1.015 1.005 - 1.030    Glucose, UA Negative Negative    Ketones, UA Negative Negative    Bilirubin, UA Negative Negative    Blood, UA Negative Negative    Protein, UA Negative Negative    Leuk Esterase, UA Trace (A) Negative    Nitrite, UA Negative Negative    Urobilinogen, UA 0.2 E.U./dL 0.2 - 1.0 E.U./dL   Urinalysis, Microscopic Only - Urine, Clean Catch    Collection Time: 06/09/25  8:53 AM    Specimen: Urine, Clean Catch   Result Value Ref Range    RBC, UA 0-2 None Seen, 0-2 /HPF    WBC, UA 0-2 None Seen, 0-2 /HPF    Bacteria, UA None Seen None Seen /HPF    Squamous Epithelial Cells, UA 0-2 None Seen, 0-2 /HPF    Hyaline Casts, UA None Seen None Seen /LPF     Methodology Automated Microscopy        ECG 12 Lead    Date/Time: 6/10/2025 9:31 AM  Performed by: Fabiana Matthews MD    Authorized by: Fabiana Matthews MD  Comparison: compared with previous ECG from 6/6/2024  Similar to previous ECG  Rhythm: sinus rhythm  Rate: normal  BPM: 70  Conduction: incomplete right bundle branch block  ST Segments: ST segments normal  T Waves: T waves normal  QRS axis: left  Clinical impression comment: stable EKG        ASSESSMENT/PLAN    Diagnoses and all orders for this visit:    1. PE (physical exam), routine (Primary)  Assessment & Plan:  Health maintenance - rec flu vacc in the fall; refuses further COVID19 vacc; rec Prevnar 20, counseling given (done today); Td 5/21; (next Tdap due 2031) HAV done; rec Shingrix, counseling given - #1 given today (#2 due between mid-Aug-Dec); mammo 7/25/24 (), L dx 8/24, screening in 11 mos; GYN/Pap at  6/20/23; DEXA ordered (last 9/22); colonoscopy 8/23/23, repeat 2028 per Dr. Cameron; eye exam UTD (contacts); dental q6 mos; (+) seat belt use      2. Hyperlipidemia  Assessment & Plan:  Lipids stable with LDL 64 with goal LDL < 130, ideally < 100; cont rosuvastatin 10mg QD #90, 3RF     Orders:  -     ECG 12 Lead    3. Impaired fasting glucose  Assessment & Plan:  BG control stable with A1C 5.3; encouraged reg phys activity to decr insulin resistance, moderation in unhealthy starches/sweets; f/u A1C in 12 mos        4. Osteopenia  Assessment & Plan:  Calcium and vitamin D supplementation with weight-bearing exercise; DEXA ordered        5. Vitamin D deficiency  Assessment & Plan:  Stable vit D level 39.1; cont 2000 units QD supplementation      6. Menopause  -     DEXA Bone Density Axial; Future    7. Need for pneumococcal 20-valent conjugate vaccination  -     Pneumococcal Conjugate Vaccine 20-Valent (<18 yrs)    8. Need for shingles vaccine  -     Shingrix Vaccine    9. Family history of breast cancer  Assessment & Plan:  Counseling re: pros vs  cons of genetic testing as well as implications depending on results; mother did not have breast CA; cont with regular screening mammograms annually          FOLLOW-UP  RTC 1yr for annual PE; fasting labs the week prior to appt (CBC, CMP, TSH, lipids, UA/micro, vit D, A1C)    Electronically signed by:    Fabiana Matthews MD, FACP  06/10/2025

## 2025-06-09 NOTE — ASSESSMENT & PLAN NOTE
Health maintenance - rec flu vacc in the fall; refuses further COVID19 vacc; rec Prevnar 20, counseling given (done today); Td 5/21; (next Tdap due 2031) HAV done; rec Shingrix, counseling given - #1 given today (#2 due between mid-Aug-Dec); mammo 7/25/24 (), L dx 8/24, screening in 11 mos; GYN/Pap at  6/20/23; DEXA ordered (last 9/22); colonoscopy 8/23/23, repeat 2028 per Dr. Cameron; eye exam UTD (contacts); dental q6 mos; (+) seat belt use

## 2025-06-10 ENCOUNTER — OFFICE VISIT (OUTPATIENT)
Dept: INTERNAL MEDICINE | Facility: CLINIC | Age: 60
End: 2025-06-10
Payer: COMMERCIAL

## 2025-06-10 VITALS
WEIGHT: 149.6 LBS | OXYGEN SATURATION: 97 % | HEART RATE: 65 BPM | DIASTOLIC BLOOD PRESSURE: 70 MMHG | HEIGHT: 67 IN | BODY MASS INDEX: 23.48 KG/M2 | SYSTOLIC BLOOD PRESSURE: 110 MMHG

## 2025-06-10 DIAGNOSIS — Z80.3 FAMILY HISTORY OF BREAST CANCER: ICD-10-CM

## 2025-06-10 DIAGNOSIS — E55.9 VITAMIN D DEFICIENCY: Chronic | ICD-10-CM

## 2025-06-10 DIAGNOSIS — Z23 NEED FOR PNEUMOCOCCAL 20-VALENT CONJUGATE VACCINATION: ICD-10-CM

## 2025-06-10 DIAGNOSIS — R73.01 IMPAIRED FASTING GLUCOSE: Chronic | ICD-10-CM

## 2025-06-10 DIAGNOSIS — Z78.0 MENOPAUSE: Chronic | ICD-10-CM

## 2025-06-10 DIAGNOSIS — Z23 NEED FOR SHINGLES VACCINE: ICD-10-CM

## 2025-06-10 DIAGNOSIS — M85.89 OSTEOPENIA OF MULTIPLE SITES: Chronic | ICD-10-CM

## 2025-06-10 DIAGNOSIS — Z00.00 PE (PHYSICAL EXAM), ROUTINE: Primary | ICD-10-CM

## 2025-06-10 DIAGNOSIS — E78.2 MIXED HYPERLIPIDEMIA: Chronic | ICD-10-CM

## 2025-06-10 NOTE — ASSESSMENT & PLAN NOTE
Counseling re: pros vs cons of genetic testing as well as implications depending on results; mother did not have breast CA; cont with regular screening mammograms annually

## 2025-06-10 NOTE — PROGRESS NOTES
Immunization  Immunization performed in left and right deltoid by Yue Plata CMA. Patient tolerated the procedure well without complications.  06/10/25   Yue Plata CMA

## 2025-06-18 DIAGNOSIS — E78.2 MIXED HYPERLIPIDEMIA: Chronic | ICD-10-CM

## 2025-06-18 RX ORDER — ROSUVASTATIN CALCIUM 10 MG/1
10 TABLET, COATED ORAL DAILY
Qty: 90 TABLET | Refills: 3 | Status: SHIPPED | OUTPATIENT
Start: 2025-06-18

## 2025-08-01 ENCOUNTER — HOSPITAL ENCOUNTER (OUTPATIENT)
Dept: BONE DENSITY | Facility: HOSPITAL | Age: 60
Discharge: HOME | End: 2025-08-01
Admitting: INTERNAL MEDICINE
Payer: COMMERCIAL

## 2025-08-01 DIAGNOSIS — Z78.0 MENOPAUSE: Chronic | ICD-10-CM

## 2025-08-01 PROCEDURE — 77080 DXA BONE DENSITY AXIAL: CPT

## 2025-08-02 ENCOUNTER — RESULTS FOLLOW-UP (OUTPATIENT)
Dept: INTERNAL MEDICINE | Facility: CLINIC | Age: 60
End: 2025-08-02
Payer: COMMERCIAL